# Patient Record
Sex: FEMALE | Race: WHITE | NOT HISPANIC OR LATINO | Employment: OTHER | URBAN - METROPOLITAN AREA
[De-identification: names, ages, dates, MRNs, and addresses within clinical notes are randomized per-mention and may not be internally consistent; named-entity substitution may affect disease eponyms.]

---

## 2019-03-27 ENCOUNTER — OFFICE VISIT (OUTPATIENT)
Dept: FAMILY MEDICINE CLINIC | Facility: CLINIC | Age: 64
End: 2019-03-27

## 2019-03-27 VITALS
OXYGEN SATURATION: 98 % | BODY MASS INDEX: 30.23 KG/M2 | SYSTOLIC BLOOD PRESSURE: 132 MMHG | DIASTOLIC BLOOD PRESSURE: 78 MMHG | TEMPERATURE: 98 F | HEIGHT: 67 IN | HEART RATE: 88 BPM

## 2019-03-27 DIAGNOSIS — E78.41 ELEVATED LIPOPROTEIN(A): ICD-10-CM

## 2019-03-27 DIAGNOSIS — L65.9 HAIR LOSS: ICD-10-CM

## 2019-03-27 DIAGNOSIS — E55.9 HYPOVITAMINOSIS D: ICD-10-CM

## 2019-03-27 DIAGNOSIS — Z23 NEED FOR TETANUS, DIPHTHERIA, AND ACELLULAR PERTUSSIS (TDAP) VACCINE: ICD-10-CM

## 2019-03-27 DIAGNOSIS — E11.9 TYPE 2 DIABETES MELLITUS WITHOUT COMPLICATION, WITHOUT LONG-TERM CURRENT USE OF INSULIN (HCC): Primary | ICD-10-CM

## 2019-03-27 DIAGNOSIS — E01.0 THYROMEGALY: ICD-10-CM

## 2019-03-27 DIAGNOSIS — Z23 NEED FOR VACCINATION AGAINST STREPTOCOCCUS PNEUMONIAE USING PNEUMOCOCCAL CONJUGATE VACCINE 13: ICD-10-CM

## 2019-03-27 PROBLEM — K52.9 CHRONIC DIARRHEA: Status: ACTIVE | Noted: 2019-03-27

## 2019-03-27 PROBLEM — J45.909 REACTIVE AIRWAY DISEASE WITHOUT COMPLICATION: Status: ACTIVE | Noted: 2019-03-27

## 2019-03-27 PROBLEM — E78.5 HYPERLIPIDEMIA: Status: ACTIVE | Noted: 2019-03-27

## 2019-03-27 PROBLEM — Z91.09 ENVIRONMENTAL ALLERGIES: Status: ACTIVE | Noted: 2019-03-27

## 2019-03-27 PROCEDURE — 90670 PCV13 VACCINE IM: CPT | Performed by: FAMILY MEDICINE

## 2019-03-27 PROCEDURE — 90715 TDAP VACCINE 7 YRS/> IM: CPT | Performed by: FAMILY MEDICINE

## 2019-03-27 PROCEDURE — G0009 ADMIN PNEUMOCOCCAL VACCINE: HCPCS | Performed by: FAMILY MEDICINE

## 2019-03-27 PROCEDURE — 99215 OFFICE O/P EST HI 40 MIN: CPT | Performed by: FAMILY MEDICINE

## 2019-03-27 PROCEDURE — 90471 IMMUNIZATION ADMIN: CPT | Performed by: FAMILY MEDICINE

## 2019-03-27 RX ORDER — OMEPRAZOLE 20 MG/1
20 CAPSULE, DELAYED RELEASE ORAL EVERY OTHER DAY
COMMUNITY

## 2019-03-27 RX ORDER — CETIRIZINE HYDROCHLORIDE 10 MG/1
10 TABLET ORAL DAILY
COMMUNITY
End: 2020-07-01

## 2019-03-27 NOTE — PROGRESS NOTES
Lou Kimball is a 63 y.o. female.     Chief Complaint   Patient presents with   • Establish Care     new pt establishing today with dr cheng   • Diabetes Mellitus     follow up pt complain about diarrhea side effects of metfomin        HPI     Pt is a pleasant 63 y.o. YO female here for Diabetes.  New patient to me and this office.   PMH includes diabetes well controlled with hemoglobin A1c last 6.1, hyperlipidemia well controlled on simvastatin, chronic diarrhea since being started on metformin.  She also has anxiety about her weight, wants to be 150 pounds, has not been that way over the last 20 years.  She does eat a healthy diet with lean proteins and vegetables.  Exercises daily.  She has extreme frustration with checking her weight multiple times a day.    The following portions of the patient's history were reviewed and updated as appropriate: allergies, current medications, past family history, past medical history, past social history, past surgical history and problem list.    Review of Systems   Constitutional: Negative.    HENT: Negative.    Eyes: Negative.    Respiratory: Negative.    Cardiovascular: Negative for chest pain, palpitations and leg swelling.   Gastrointestinal: Positive for diarrhea.   Endocrine: Negative for cold intolerance, heat intolerance, polydipsia, polyphagia and polyuria.   Musculoskeletal: Negative.    Allergic/Immunologic: Negative.    Neurological: Negative.    Hematological: Negative.    Psychiatric/Behavioral: Positive for sleep disturbance.       Objective  Vitals:    03/27/19 0819   BP: 132/78   Pulse: 88   Temp: 98 °F (36.7 °C)   SpO2: 98%        Physical Exam   Constitutional: She is oriented to person, place, and time. She appears well-developed and well-nourished. No distress.   HENT:   Head: Normocephalic.   Nose: Nose normal.   Eyes: EOM are normal.   Neck: Thyromegaly present.   Cardiovascular: Normal rate, regular rhythm, normal heart sounds and intact distal  pulses.   No murmur heard.  Pulmonary/Chest: Effort normal and breath sounds normal. No respiratory distress.   Musculoskeletal: Normal range of motion.   Neurological: She is alert and oriented to person, place, and time.   Skin: Skin is warm and dry. No rash noted.   Psychiatric: She has a normal mood and affect. Her behavior is normal. Judgment and thought content normal.   Nursing note and vitals reviewed.        Current Outpatient Medications:   •  cetirizine (zyrTEC) 10 MG tablet, Take 10 mg by mouth Daily., Disp: , Rfl:   •  Cholecalciferol (VITAMIN D3) 5000 units capsule capsule, Take 5,000 Units by mouth Daily., Disp: , Rfl:   •  metFORMIN (GLUCOPHAGE) 500 MG tablet, Take 500 mg by mouth 2 (Two) Times a Day With Meals., Disp: , Rfl:   •  montelukast (SINGULAIR) 10 MG tablet, Take 10 mg by mouth Every Night., Disp: , Rfl:   •  omeprazole (priLOSEC) 20 MG capsule, Take 20 mg by mouth Daily., Disp: , Rfl:   •  Pioglitazone HCl (ACTOS PO), Take  by mouth., Disp: , Rfl:   •  SIMVASTATIN PO, Take  by mouth., Disp: , Rfl:   •  Empagliflozin (JARDIANCE) 10 MG tablet, Take 1 tablet by mouth Daily., Disp: 14 tablet, Rfl: 3    Procedures    Lab Results (most recent)     None              Lou was seen today for establish care and diabetes mellitus.    Diagnoses and all orders for this visit:    Type 2 diabetes mellitus without complication, without long-term current use of insulin (CMS/[facility])  -     Comprehensive Metabolic Panel  -     Hemoglobin A1c  -     Empagliflozin (JARDIANCE) 10 MG tablet; Take 1 tablet by mouth Daily.    Elevated lipoprotein(a)  -     Comprehensive Metabolic Panel  -     Lipid Panel    Hypovitaminosis D  -     Vitamin D 25 Hydroxy    Hair loss  -     TSH Rfx On Abnormal To Free T4    Need for tetanus, diphtheria, and acellular pertussis (Tdap) vaccine  -     Tdap Vaccine Greater Than or Equal To 6yo IM    Need for vaccination against Streptococcus pneumoniae using pneumococcal conjugate  vaccine 13  -     Pneumococcal Conjugate Vaccine 13-[facility]    Thyromegaly  -     TSH Rfx On Abnormal To Free T4    Pleasant 63-year-old female here as a new patient.  She is very obsessive about her weight, refusing to be weighed today, at home she weighed today: 195.6lbs.  She is extremely frustrated as she eats a healthy diet and exercises daily.  She noticed about a 15-20 pound weight gain since starting Actos.  Her goal is to wait 150 pounds.  She last weighed that about 20 years ago, some difficulty with losing weight since menopause.  She does have hyperlipidemia and tries to eat oats to improve that, is adherent with simvastatin.  She has had symptoms of hair loss diffusely, wondering if thyroid function is abnormal.  Records requested from her last PCP in [place], due for pneumonia vaccine and tetanus vaccine.  On exam mild thyromegaly, symmetric.  Will continue to watch.    8:25 - 9:20  30 minutes was spent of the 55 minute visit in direct face to face counseling and coordination of care regarding: Diet for DM and weight loss, exercise, hx with weight gain from meds.   Encounter Diagnoses   Name Primary?   • Type 2 diabetes mellitus without complication, without long-term current use of insulin (CMS/[facility]) Yes   • Elevated lipoprotein(a)    • Hypovitaminosis D    • Hair loss    • Need for tetanus, diphtheria, and acellular pertussis (Tdap) vaccine    • Need for vaccination against Streptococcus pneumoniae using pneumococcal conjugate vaccine 13    • Thyromegaly          Return in about 4 weeks (around 4/24/2019), or if symptoms worsen or fail to improve, for Recheck Diabetes .      Caroline Gomes MD

## 2019-04-03 ENCOUNTER — TELEPHONE (OUTPATIENT)
Dept: FAMILY MEDICINE CLINIC | Facility: CLINIC | Age: 64
End: 2019-04-03

## 2019-04-03 NOTE — TELEPHONE ENCOUNTER
Pt was told to call in a week to let  know how the Empagliflozin 10 mg is working. I informed pt that  will be back in the office tomorrow. Pt requesting phone call from Dr. Gomes whenever possible sometime tomorrow to update.

## 2019-04-04 ENCOUNTER — TELEPHONE (OUTPATIENT)
Dept: FAMILY MEDICINE CLINIC | Facility: CLINIC | Age: 64
End: 2019-04-04

## 2019-04-04 DIAGNOSIS — E11.9 TYPE 2 DIABETES MELLITUS WITHOUT COMPLICATION, WITHOUT LONG-TERM CURRENT USE OF INSULIN (HCC): ICD-10-CM

## 2019-04-04 LAB
25(OH)D3+25(OH)D2 SERPL-MCNC: 33.8 NG/ML (ref 30–100)
ALBUMIN SERPL-MCNC: 4.8 G/DL (ref 3.5–5.2)
ALBUMIN/GLOB SERPL: 2.2 G/DL
ALP SERPL-CCNC: 106 U/L (ref 39–117)
ALT SERPL-CCNC: 27 U/L (ref 1–33)
AST SERPL-CCNC: 22 U/L (ref 1–32)
BILIRUB SERPL-MCNC: 0.4 MG/DL (ref 0.2–1.2)
BUN SERPL-MCNC: 12 MG/DL (ref 8–23)
BUN/CREAT SERPL: 14.6 (ref 7–25)
CALCIUM SERPL-MCNC: 10 MG/DL (ref 8.6–10.5)
CHLORIDE SERPL-SCNC: 101 MMOL/L (ref 98–107)
CHOLEST SERPL-MCNC: 179 MG/DL (ref 0–200)
CO2 SERPL-SCNC: 25.8 MMOL/L (ref 22–29)
CREAT SERPL-MCNC: 0.82 MG/DL (ref 0.57–1)
GLOBULIN SER CALC-MCNC: 2.2 GM/DL
GLUCOSE SERPL-MCNC: 153 MG/DL (ref 65–99)
HBA1C MFR BLD: 7.2 % (ref 4.8–5.6)
HDLC SERPL-MCNC: 55 MG/DL (ref 40–60)
LDLC SERPL CALC-MCNC: 87 MG/DL (ref 0–100)
POTASSIUM SERPL-SCNC: 4.2 MMOL/L (ref 3.5–5.2)
PROT SERPL-MCNC: 7 G/DL (ref 6–8.5)
SODIUM SERPL-SCNC: 140 MMOL/L (ref 136–145)
T4 FREE SERPL-MCNC: NORMAL NG/DL
TRIGL SERPL-MCNC: 183 MG/DL (ref 0–150)
TSH SERPL DL<=0.005 MIU/L-ACNC: 1.73 MIU/ML (ref 0.27–4.2)
VLDLC SERPL CALC-MCNC: 36.6 MG/DL

## 2019-04-04 NOTE — TELEPHONE ENCOUNTER
Blood sugar is going up to 143 and 155 every day going up a little bit pt has lost 6 pound in a week

## 2019-04-04 NOTE — TELEPHONE ENCOUNTER
I would advise that we continue his medications, I would be curious what time the blood sugars are checked.  I would advise only fasting morning blood sugar.  Please have her record these numbers and follow-up with me in a couple weeks.  It takes time for the medication to come to therapeutic dose.

## 2019-05-01 ENCOUNTER — OFFICE VISIT (OUTPATIENT)
Dept: FAMILY MEDICINE CLINIC | Facility: CLINIC | Age: 64
End: 2019-05-01

## 2019-05-01 VITALS
BODY MASS INDEX: 30.23 KG/M2 | HEIGHT: 67 IN | SYSTOLIC BLOOD PRESSURE: 118 MMHG | DIASTOLIC BLOOD PRESSURE: 82 MMHG | TEMPERATURE: 97.7 F | HEART RATE: 78 BPM | OXYGEN SATURATION: 98 %

## 2019-05-01 DIAGNOSIS — J45.20 MILD INTERMITTENT REACTIVE AIRWAY DISEASE WITHOUT COMPLICATION: ICD-10-CM

## 2019-05-01 DIAGNOSIS — E11.9 TYPE 2 DIABETES MELLITUS WITHOUT COMPLICATION, WITHOUT LONG-TERM CURRENT USE OF INSULIN (HCC): Primary | ICD-10-CM

## 2019-05-01 DIAGNOSIS — N89.8 VAGINAL ITCHING: ICD-10-CM

## 2019-05-01 DIAGNOSIS — E78.2 MIXED HYPERLIPIDEMIA: ICD-10-CM

## 2019-05-01 PROCEDURE — 99214 OFFICE O/P EST MOD 30 MIN: CPT | Performed by: FAMILY MEDICINE

## 2019-05-01 RX ORDER — ATORVASTATIN CALCIUM 20 MG/1
20 TABLET, FILM COATED ORAL DAILY
Qty: 90 TABLET | Refills: 3 | Status: SHIPPED | OUTPATIENT
Start: 2019-05-01 | End: 2020-03-17 | Stop reason: SDUPTHER

## 2019-05-01 RX ORDER — MONTELUKAST SODIUM 10 MG/1
10 TABLET ORAL NIGHTLY
Qty: 90 TABLET | Refills: 3 | Status: SHIPPED | OUTPATIENT
Start: 2019-05-01 | End: 2020-03-17 | Stop reason: SDUPTHER

## 2019-05-01 NOTE — PROGRESS NOTES
Lou Kimball is a 63 y.o. female.     Chief Complaint   Patient presents with   • Diabetes Mellitus     follow up complains   • Vaginal Itching     for about 4 weeks after diabetes treatment        Diabetes   She presents for her follow-up diabetic visit. She has type 2 diabetes mellitus. No MedicAlert identification noted. The initial diagnosis of diabetes was made 13 years ago. Pertinent negatives for hypoglycemia include no confusion, dizziness, hunger, mood changes, pallor, seizures, speech difficulty or sweats. Associated symptoms include weight loss. Pertinent negatives for diabetes include no blurred vision, no chest pain, no fatigue, no foot paresthesias, no foot ulcerations, no polydipsia, no polyphagia, no polyuria, no visual change and no weakness. Pertinent negatives for hypoglycemia complications include no blackouts, no hospitalization, no required assistance and no required glucagon injection. Pertinent negatives for diabetic complications include no CVA, heart disease, nephropathy, peripheral neuropathy or retinopathy. Risk factors for coronary artery disease include dyslipidemia, family history and obesity. Current diabetic treatment includes diet and oral agent (dual therapy). She is compliant with treatment all of the time. Her weight is fluctuating minimally. She is following a generally healthy diet. Meal planning includes ADA exchanges. She has not had a previous visit with a dietitian. She participates in exercise daily. She monitors blood glucose at home 1-2 x per week. Blood glucose monitoring compliance is excellent. Her home blood glucose trend is decreasing steadily. She does not see a podiatrist.Eye exam is current.      Vaginal itching, new problem.  Started after starting Jardiance.  Clear discharge without odor.  Some pruritus.  She has not had issues with yeast infections or bacterial vaginosis in the past.  Wondering if this is a side effect from the medication.    The  following portions of the patient's history were reviewed and updated as appropriate: allergies, current medications, past family history, past medical history, past social history, past surgical history and problem list.    Review of Systems   Constitutional: Positive for weight loss. Negative for fatigue.   Eyes: Negative for blurred vision.   Cardiovascular: Negative for chest pain.   Endocrine: Negative for polydipsia, polyphagia and polyuria.   Skin: Negative for pallor.   Neurological: Negative for dizziness, seizures, speech difficulty and weakness.   Psychiatric/Behavioral: Negative for confusion.       Objective  Vitals:    05/01/19 0805   BP: 118/82   Pulse: 78   Temp: 97.7 °F (36.5 °C)   SpO2: 98%        Physical Exam   Constitutional: She is oriented to person, place, and time. She appears well-developed and well-nourished. No distress.   HENT:   Head: Normocephalic.   Nose: Nose normal.   Eyes: EOM are normal. No scleral icterus.   Pulmonary/Chest: Effort normal. No respiratory distress.   Genitourinary:   Genitourinary Comments: Atrophy of the vaginal wall, clear discharge, no odor.  No tenderness   Musculoskeletal: Normal range of motion.   Neurological: She is alert and oriented to person, place, and time.   Skin: Skin is warm and dry. No rash noted.   Psychiatric: She has a normal mood and affect. Her behavior is normal. Judgment and thought content normal.   Nursing note and vitals reviewed.        Current Outpatient Medications:   •  cetirizine (zyrTEC) 10 MG tablet, Take 10 mg by mouth Daily., Disp: , Rfl:   •  Cholecalciferol (VITAMIN D3) 5000 units capsule capsule, Take 5,000 Units by mouth Daily., Disp: , Rfl:   •  Empagliflozin (JARDIANCE) 10 MG tablet, Take 10 mg by mouth Daily., Disp: 90 tablet, Rfl: 3  •  metFORMIN (GLUCOPHAGE) 500 MG tablet, Take 1 tablet by mouth 2 (Two) Times a Day With Meals., Disp: 180 tablet, Rfl: 3  •  montelukast (SINGULAIR) 10 MG tablet, Take 1 tablet by mouth  Every Night., Disp: 90 tablet, Rfl: 3  •  atorvastatin (LIPITOR) 20 MG tablet, Take 1 tablet by mouth Daily., Disp: 90 tablet, Rfl: 3  •  omeprazole (priLOSEC) 20 MG capsule, Take 20 mg by mouth Daily., Disp: , Rfl:     Procedures    Lab Results (most recent)     None              Lou was seen today for diabetes mellitus and vaginal itching.    Diagnoses and all orders for this visit:    Type 2 diabetes mellitus without complication, without long-term current use of insulin (CMS/[facility])  -     metFORMIN (GLUCOPHAGE) 500 MG tablet; Take 1 tablet by mouth 2 (Two) Times a Day With Meals.    Mild intermittent reactive airway disease without complication  -     montelukast (SINGULAIR) 10 MG tablet; Take 1 tablet by mouth Every Night.    Mixed hyperlipidemia  -     atorvastatin (LIPITOR) 20 MG tablet; Take 1 tablet by mouth Daily.    Vaginal itching  -     NuSwab BV & Candida - Swab, Vagina      Type 2 diabetes previously with hemoglobin A1c of 7.2, it does seem that her blood sugars have crept up a little bit.  I think this could be from nutrition.  We discussed lower carb options for dinner.  Goal fasting blood sugar less than 130.  Cost of Jardiance was $700.    Mild reactive airway disease, stable montelukast, refill medication today.    Hyperlipidemia, previously on simvastatin, will change to atorvastatin as research is better for this.    Vaginal itching, likely vaginal atrophy.  However will check swab today for bacterial vaginosis and yeast infection.    She has a phobia of having her weight checked at the doctor's.  Refused again today.  Will bring in her scale from home to titrate.  She weighed 188 pounds at home.    Return in about 2 months (around 7/1/2019), or if symptoms worsen or fail to improve, for Recheck Diabetes .      Caroline Gomes MD

## 2019-05-03 LAB
A VAGINAE DNA VAG QL NAA+PROBE: NORMAL SCORE
BVAB2 DNA VAG QL NAA+PROBE: NORMAL SCORE
C ALBICANS DNA VAG QL NAA+PROBE: NEGATIVE
C GLABRATA DNA VAG QL NAA+PROBE: NEGATIVE
MEGA1 DNA VAG QL NAA+PROBE: NORMAL SCORE

## 2019-05-08 NOTE — PROGRESS NOTES
Please call patient back with results.  The vaginal swab has resulted as negative for yeast infection or bacterial vaginosis. This could be related to vaginal atrophy that is getting irritated with a higher blood sugar levels in the urine.  I would not recommend making any changes at this time, however if it worsens or is bothersome we can consider further evaluation.  Please let me know if there are further questions.  Thank you

## 2019-06-28 ENCOUNTER — OFFICE VISIT (OUTPATIENT)
Dept: FAMILY MEDICINE CLINIC | Facility: CLINIC | Age: 64
End: 2019-06-28

## 2019-06-28 VITALS
DIASTOLIC BLOOD PRESSURE: 82 MMHG | WEIGHT: 180 LBS | TEMPERATURE: 98.2 F | HEIGHT: 67 IN | BODY MASS INDEX: 28.25 KG/M2 | HEART RATE: 65 BPM | SYSTOLIC BLOOD PRESSURE: 124 MMHG | OXYGEN SATURATION: 99 %

## 2019-06-28 DIAGNOSIS — R19.7 DIARRHEA, UNSPECIFIED TYPE: ICD-10-CM

## 2019-06-28 DIAGNOSIS — E11.9 TYPE 2 DIABETES MELLITUS WITHOUT COMPLICATION, WITHOUT LONG-TERM CURRENT USE OF INSULIN (HCC): Primary | ICD-10-CM

## 2019-06-28 DIAGNOSIS — Z12.39 ENCOUNTER FOR SCREENING BREAST EXAMINATION: ICD-10-CM

## 2019-06-28 PROCEDURE — 99214 OFFICE O/P EST MOD 30 MIN: CPT | Performed by: FAMILY MEDICINE

## 2019-06-28 RX ORDER — DIPHENOXYLATE HYDROCHLORIDE AND ATROPINE SULFATE 2.5; .025 MG/1; MG/1
1 TABLET ORAL 4 TIMES DAILY PRN
Qty: 30 TABLET | Refills: 0 | Status: SHIPPED | OUTPATIENT
Start: 2019-06-28 | End: 2019-11-05

## 2019-06-28 NOTE — PROGRESS NOTES
Lou Kimball is a 63 y.o. female.     Chief Complaint   Patient presents with   • Diabetes Mellitus     follow up no complains   • Diarrhea       HPI     Pt is a pleasant 63 y.o. YO female here for Diabetes and new problem to me of diarrhea.      Diabetes: improving, her blood sugars have moved from the 130s 140s to now 90s and 80s fasting in the morning.  She has started Jardiance and has lost about 20 pounds.  She feels significantly healthier.  Additionally she has also made lifestyle changes with now doing a keto diet, having difficulty finding plant options to include as well.  Her diet does have lots of saturated fats at the moment.  Her weight has improved significantly, at home her weight was 208 and is improved to 180 pounds.  208 to 180 lbs.      Diarrhea, this is a new problem to me, it has been a chronic issue for the patient for years.  She does feel like her diarrhea is improved with the dietary changes she has made.  She will be traveling to [place] for [employer] this summer and often gets diarrhea when traveling.  Requesting Lomotil to help treat these issues.    The following portions of the patient's history were reviewed and updated as appropriate: allergies, current medications, past family history, past medical history, past social history, past surgical history and problem list.    Review of Systems   Constitutional: Negative.    HENT: Positive for sore throat (right side).    Eyes: Negative.    Respiratory: Negative.    Cardiovascular: Negative.    Gastrointestinal: Negative.    Endocrine: Negative for cold intolerance, heat intolerance, polydipsia and polyphagia.   Genitourinary: Negative.    Musculoskeletal: Negative.    Neurological: Negative for numbness.   Hematological: Negative.    Psychiatric/Behavioral: Negative.        Objective  Vitals:    06/28/19 0810   BP: 124/82   Pulse: 65   Temp: 98.2 °F (36.8 °C)   SpO2: 99%        Physical Exam   Constitutional: She is oriented to person,  place, and time. She appears well-developed and well-nourished. No distress.   HENT:   Head: Normocephalic.   Nose: Nose normal.   Eyes: EOM are normal.   Cardiovascular: Normal rate, regular rhythm, normal heart sounds and intact distal pulses.   No murmur heard.  Pulmonary/Chest: Effort normal and breath sounds normal. No respiratory distress.   Musculoskeletal: Normal range of motion.   Neurological: She is alert and oriented to person, place, and time.   Skin: Skin is warm and dry. No rash noted.   Psychiatric: She has a normal mood and affect. Her behavior is normal. Judgment and thought content normal.   Nursing note and vitals reviewed.        Current Outpatient Medications:   •  atorvastatin (LIPITOR) 20 MG tablet, Take 1 tablet by mouth Daily., Disp: 90 tablet, Rfl: 3  •  cetirizine (zyrTEC) 10 MG tablet, Take 10 mg by mouth Daily., Disp: , Rfl:   •  Cholecalciferol (VITAMIN D3) 5000 units capsule capsule, Take 5,000 Units by mouth Daily., Disp: , Rfl:   •  Empagliflozin (JARDIANCE) 10 MG tablet, Take 10 mg by mouth Daily., Disp: 90 tablet, Rfl: 3  •  metFORMIN (GLUCOPHAGE) 500 MG tablet, Take 1 tablet by mouth 2 (Two) Times a Day With Meals., Disp: 180 tablet, Rfl: 3  •  montelukast (SINGULAIR) 10 MG tablet, Take 1 tablet by mouth Every Night., Disp: 90 tablet, Rfl: 3  •  omeprazole (priLOSEC) 20 MG capsule, Take 20 mg by mouth Daily., Disp: , Rfl:   •  diphenoxylate-atropine (LOMOTIL) 2.5-0.025 MG per tablet, Take 1 tablet by mouth 4 (Four) Times a Day As Needed for Diarrhea., Disp: 30 tablet, Rfl: 0    Procedures    Lab Results (most recent)     None              Lou was seen today for diabetes mellitus and diarrhea.    Diagnoses and all orders for this visit:    Type 2 diabetes mellitus without complication, without long-term current use of insulin (CMS/[facility])  -     Hemoglobin A1c    Diarrhea, unspecified type  -     diphenoxylate-atropine (LOMOTIL) 2.5-0.025 MG per tablet; Take 1 tablet by mouth 4  (Four) Times a Day As Needed for Diarrhea.    Encounter for screening breast examination  -     Mammo Screening Bilateral With CAD; Future      Type 2 diabetes well controlled with metformin and Jardiance, she has lost about 20 pounds.  Additionally making dietary changes with now eating a ketogenic diet.  We discussed that it would be wise to include high plant sources of fat.  She has not been any medically vegetables, my concern is with high saturated fats.  Okay to decrease Jardiance to every other day as it has been very expensive.  Samples given today.    Diarrhea when traveling, chronic diarrhea seems to improve significantly with the dietary changes she made.  Prescription for Lomotil to use while traveling in [place].    Patient due for breast cancer screening in September.  She did have one abnormal mammogram in 2017 and required stereotactic biopsy that was negative.  Otherwise hemogram since have been reassuring.  These prior were done in [place].    Return if symptoms worsen or fail to improve, for Medicare Wellness in Sept .      Caroline Gomes MD

## 2019-06-29 LAB — HBA1C MFR BLD: 7 % (ref 4.8–5.6)

## 2019-08-14 RX ORDER — ALBUTEROL SULFATE 90 UG/1
2 AEROSOL, METERED RESPIRATORY (INHALATION) EVERY 4 HOURS PRN
Qty: 1 INHALER | Refills: 1 | Status: SHIPPED | OUTPATIENT
Start: 2019-08-14 | End: 2019-08-15 | Stop reason: SDUPTHER

## 2019-08-15 RX ORDER — ALBUTEROL SULFATE 90 UG/1
2 AEROSOL, METERED RESPIRATORY (INHALATION) EVERY 4 HOURS PRN
Qty: 1 INHALER | Refills: 1 | Status: SHIPPED | OUTPATIENT
Start: 2019-08-15 | End: 2020-03-17 | Stop reason: SDUPTHER

## 2019-09-09 ENCOUNTER — HOSPITAL ENCOUNTER (OUTPATIENT)
Dept: MAMMOGRAPHY | Facility: HOSPITAL | Age: 64
Discharge: HOME | End: 2019-09-09
Admitting: FAMILY MEDICINE

## 2019-09-09 DIAGNOSIS — Z12.39 ENCOUNTER FOR SCREENING BREAST EXAMINATION: ICD-10-CM

## 2019-09-09 PROCEDURE — 77067 SCR MAMMO BI INCL CAD: CPT

## 2019-09-11 NOTE — PROGRESS NOTES
Please call patient back with mammogram.  There is no evidence of breast cancer.  We can continue routine follow up.     Thank you

## 2019-09-19 ENCOUNTER — OFFICE VISIT (OUTPATIENT)
Dept: FAMILY MEDICINE CLINIC | Facility: CLINIC | Age: 64
End: 2019-09-19

## 2019-09-19 VITALS
TEMPERATURE: 97.8 F | HEIGHT: 67 IN | DIASTOLIC BLOOD PRESSURE: 78 MMHG | BODY MASS INDEX: 27.47 KG/M2 | SYSTOLIC BLOOD PRESSURE: 114 MMHG | WEIGHT: 175 LBS | HEART RATE: 73 BPM | OXYGEN SATURATION: 99 %

## 2019-09-19 DIAGNOSIS — Z78.0 POSTMENOPAUSAL: ICD-10-CM

## 2019-09-19 DIAGNOSIS — Z11.59 ENCOUNTER FOR HEPATITIS C SCREENING TEST FOR LOW RISK PATIENT: ICD-10-CM

## 2019-09-19 DIAGNOSIS — Z00.00 MEDICARE ANNUAL WELLNESS VISIT, SUBSEQUENT: Primary | ICD-10-CM

## 2019-09-19 DIAGNOSIS — E11.9 TYPE 2 DIABETES MELLITUS WITHOUT COMPLICATION, WITHOUT LONG-TERM CURRENT USE OF INSULIN (HCC): ICD-10-CM

## 2019-09-19 DIAGNOSIS — E55.9 HYPOVITAMINOSIS D: ICD-10-CM

## 2019-09-19 DIAGNOSIS — E78.41 ELEVATED LIPOPROTEIN(A): ICD-10-CM

## 2019-09-19 PROBLEM — K52.9 CHRONIC DIARRHEA: Status: RESOLVED | Noted: 2019-03-27 | Resolved: 2019-09-19

## 2019-09-19 PROCEDURE — G0438 PPPS, INITIAL VISIT: HCPCS | Performed by: FAMILY MEDICINE

## 2019-09-19 RX ORDER — ZOSTER VACCINE RECOMBINANT, ADJUVANTED 50 MCG/0.5
KIT INTRAMUSCULAR
COMMUNITY
Start: 2019-09-14 | End: 2019-09-19

## 2019-09-19 NOTE — PROGRESS NOTES
The ABCs of the Annual Wellness Visit  Initial Medicare Wellness Visit    Chief Complaint   Patient presents with   • Annual Exam     awv       Subjective   History of Present Illness:  Lou Kimball is a 64 y.o. female who presents for an Initial Medicare Wellness Visit.    HEALTH RISK ASSESSMENT    Recent Hospitalizations:  No hospitalization(s) within the last year.    Current Medical Providers:  Patient Care Team:  Caroline Gomes MD as PCP - General (Family Medicine)  Caroline Gomes MD as PCP - Claims Attributed    Smoking Status:  Social History     Tobacco Use   Smoking Status Never Smoker       Alcohol Consumption:  Social History     Substance and Sexual Activity   Alcohol Use No       Depression Screen:   PHQ-2/PHQ-9 Depression Screening 9/19/2019   Little interest or pleasure in doing things 0   Feeling down, depressed, or hopeless 0   Total Score 0       Fall Risk Screen:  INOCENCIO Fall Risk Assessment was completed, and patient is at MODERATE risk for falls. Assessment completed on:9/19/2019    Health Habits and Functional and Cognitive Screening:  Functional & Cognitive Status 9/19/2019   Do you have difficulty preparing food and eating? No   Do you have difficulty bathing yourself, getting dressed or grooming yourself? No   Do you have difficulty using the toilet? No   Do you have difficulty moving around from place to place? No   Do you have trouble with steps or getting out of a bed or a chair? No   Current Diet Well Balanced Diet   Dental Exam Up to date   Eye Exam Up to date   Exercise (times per week) 7 times per week   Current Exercise Activities Include Walking   Do you need help using the phone?  No   Are you deaf or do you have serious difficulty hearing?  No   Do you need help with transportation? No   Do you need help shopping? No   Do you need help preparing meals?  No   Do you need help with housework?  No   Do you need help with laundry? No   Do you need help taking your medications? No    Do you need help managing money? No   Have you felt unusual stress, anger or loneliness in the last month? No   Who do you live with? Spouse   If you need help, do you have trouble finding someone available to you? No   Have you been bothered in the last four weeks by sexual problems? No   Do you have difficulty concentrating, remembering or making decisions? No         Does the patient have evidence of cognitive impairment? No    Asprin use counseling:Does not need ASA (and currently is not on it)    Age-appropriate Screening Schedule:  Refer to the list below for future screening recommendations based on patient's age, sex and/or medical conditions. Orders for these recommended tests are listed in the plan section. The patient has been provided with a written plan.    Health Maintenance   Topic Date Due   • URINE MICROALBUMIN  1955   • DIABETIC FOOT EXAM  03/27/2019   • ZOSTER VACCINE (2 of 2) 11/09/2019   • HEMOGLOBIN A1C  12/28/2019   • PNEUMOCOCCAL VACCINES (65+ LOW/MEDIUM RISK) (2 of 2 - PPSV23) 03/27/2020   • LIPID PANEL  04/03/2020   • DIABETIC EYE EXAM  05/01/2020   • COLONOSCOPY  01/01/2021   • MAMMOGRAM  09/09/2021   • TDAP/TD VACCINES (2 - Td) 03/27/2029   • INFLUENZA VACCINE  Completed          The following portions of the patient's history were reviewed and updated as appropriate: allergies, current medications, past family history, past medical history, past social history, past surgical history and problem list.    Outpatient Medications Prior to Visit   Medication Sig Dispense Refill   • albuterol sulfate  (90 Base) MCG/ACT inhaler Inhale 2 puffs Every 4 (Four) Hours As Needed for Wheezing. 1 inhaler 1   • atorvastatin (LIPITOR) 20 MG tablet Take 1 tablet by mouth Daily. 90 tablet 3   • BABY ASPIRIN PO Take 1 tablet by mouth Every Other Day.     • cetirizine (zyrTEC) 10 MG tablet Take 10 mg by mouth Daily.     • Cholecalciferol (VITAMIN D3) 5000 units capsule capsule Take 5,000 Units  by mouth Daily.     • diphenoxylate-atropine (LOMOTIL) 2.5-0.025 MG per tablet Take 1 tablet by mouth 4 (Four) Times a Day As Needed for Diarrhea. 30 tablet 0   • Empagliflozin (JARDIANCE) 10 MG tablet Take 10 mg by mouth Daily. 90 tablet 3   • metFORMIN (GLUCOPHAGE) 500 MG tablet Take 1 tablet by mouth 2 (Two) Times a Day With Meals. 180 tablet 3   • montelukast (SINGULAIR) 10 MG tablet Take 1 tablet by mouth Every Night. 90 tablet 3   • omeprazole (priLOSEC) 20 MG capsule Take 20 mg by mouth Daily.     • azithromycin (ZITHROMAX) 250 MG tablet Take 2 tablets the first day, then 1 tablet daily for 4 days. 6 tablet 0   • SHINGRIX 50 MCG/0.5ML reconstituted suspension        No facility-administered medications prior to visit.        Patient Active Problem List   Diagnosis   • Type 2 diabetes mellitus without complication, without long-term current use of insulin (CMS/[facility])   • Hyperlipidemia   • Environmental allergies   • Reactive airway disease without complication       Advanced Care Planning:  Patient has an advance directive - a copy has not been provided. Have asked the patient to send this to us to add to record    Review of Systems   Constitutional: Negative.    HENT: Negative.    Eyes: Negative.    Respiratory: Negative.    Cardiovascular: Negative.    Gastrointestinal: Negative.    Endocrine: Negative.    Genitourinary: Negative.    Musculoskeletal: Negative.    Skin: Negative.    Allergic/Immunologic: Negative.    Neurological: Negative.    Hematological: Negative.    Psychiatric/Behavioral: Negative.        Compared to one year ago, the patient feels her physical health is better - weighing less.  Compared to one year ago, the patient feels her mental health is the same.    Reviewed chart for potential of high risk medication in the elderly: yes  Reviewed chart for potential of harmful drug interactions in the elderly:yes    Objective         Vitals:    09/19/19 0755   BP: 114/78   Pulse: 73   Temp: 97.8  "°F (36.6 °C)   TempSrc: Oral   SpO2: 99%   Weight: 79.4 kg (175 lb)  Comment: pt refuse   Height: 170.2 cm (67\")   PainSc: 0-No pain       Body mass index is 27.41 kg/m².  Discussed the patient's BMI with her. The BMI is above average; BMI management plan is completed.    Physical Exam   Constitutional: She is oriented to person, place, and time. She appears well-developed and well-nourished. No distress.   HENT:   Head: Normocephalic.   Nose: Nose normal.   Eyes: EOM are normal.   Cardiovascular: Normal rate, regular rhythm, normal heart sounds and intact distal pulses.   No murmur heard.  Pulmonary/Chest: Effort normal and breath sounds normal. No respiratory distress.   Musculoskeletal: Normal range of motion.   Neurological: She is alert and oriented to person, place, and time.   Skin: Skin is warm and dry. No rash noted.   Psychiatric: She has a normal mood and affect. Her behavior is normal. Judgment and thought content normal.   Nursing note and vitals reviewed.      Lab Results   Component Value Date    HGBA1C 7.00 (H) 06/28/2019        Assessment/Plan   Medicare Risks and Personalized Health Plan  CMS Preventative Services Quick Reference  Colon Cancer Screening  Depression/Dysphoria  Immunizations Discussed/Encouraged (specific immunizations; Had all vaccines recently with going to [place].  UTD )  Inactivity/Sedentary  Obesity/Overweight   Osteoprorosis Risk    The above risks/problems have been discussed with the patient.  Pertinent information has been shared with the patient in the After Visit Summary.  Follow up plans and orders are seen below in the Assessment/Plan Section.    Weight loss: has been eating Keto and has lost about 15 lbs  Exercise: doing weights, walking.     Diagnoses and all orders for this visit:    1. Medicare annual wellness visit, subsequent (Primary)    2. Type 2 diabetes mellitus without complication, without long-term current use of insulin (CMS/[facility])  -     Hemoglobin A1c    3. " Elevated lipoprotein(a)  -     Hemoglobin A1c  -     Comprehensive Metabolic Panel  -     Lipid Panel    4. Hypovitaminosis D  -     Vitamin D 25 Hydroxy    5. Encounter for hepatitis C screening test for low risk patient  -     Hepatitis C Antibody    6. Postmenopausal  -     DEXA Bone Density Axial; Future      HM UTD, she has been eating a plant-based diet and keto diet and lost about 20 pounds.  Starting to exercise and noticing improvement in her body shape and energy.  Continue with this, immunizations all up-to-date as she is traveling to [place].  Colonoscopy and mammogram up-to-date, due for DEXA scan.  Otherwise labs as above.      Will get microalbumin at next visit as she is not on an ACEi.  BP is normal.     Follow Up:  Return in about 3 months (around 12/19/2019), or if symptoms worsen or fail to improve, for Recheck Diabetes with microalbumin .     An After Visit Summary and PPPS were given to the patient.

## 2019-09-20 LAB
25(OH)D3+25(OH)D2 SERPL-MCNC: 29.6 NG/ML (ref 30–100)
ALBUMIN SERPL-MCNC: 4.4 G/DL (ref 3.5–5.2)
ALBUMIN/GLOB SERPL: 1.6 G/DL
ALP SERPL-CCNC: 121 U/L (ref 39–117)
ALT SERPL-CCNC: 30 U/L (ref 1–33)
AST SERPL-CCNC: 17 U/L (ref 1–32)
BILIRUB SERPL-MCNC: 0.5 MG/DL (ref 0.2–1.2)
BUN SERPL-MCNC: 14 MG/DL (ref 8–23)
BUN/CREAT SERPL: 18.2 (ref 7–25)
CALCIUM SERPL-MCNC: 9.7 MG/DL (ref 8.6–10.5)
CHLORIDE SERPL-SCNC: 102 MMOL/L (ref 98–107)
CHOLEST SERPL-MCNC: 148 MG/DL (ref 0–200)
CO2 SERPL-SCNC: 23.1 MMOL/L (ref 22–29)
CREAT SERPL-MCNC: 0.77 MG/DL (ref 0.57–1)
GLOBULIN SER CALC-MCNC: 2.7 GM/DL
GLUCOSE SERPL-MCNC: 128 MG/DL (ref 65–99)
HBA1C MFR BLD: 7.8 % (ref 4.8–5.6)
HCV AB S/CO SERPL IA: <0.1 S/CO RATIO (ref 0–0.9)
HDLC SERPL-MCNC: 44 MG/DL (ref 40–60)
LDLC SERPL CALC-MCNC: 70 MG/DL (ref 0–100)
POTASSIUM SERPL-SCNC: 4.4 MMOL/L (ref 3.5–5.2)
PROT SERPL-MCNC: 7.1 G/DL (ref 6–8.5)
SODIUM SERPL-SCNC: 141 MMOL/L (ref 136–145)
TRIGL SERPL-MCNC: 172 MG/DL (ref 0–150)
VLDLC SERPL CALC-MCNC: 34.4 MG/DL

## 2019-09-26 NOTE — PROGRESS NOTES
Please call patient back with results.  The labs has resulted as slightly worsened hemoglobin A1c is 7.8.  I do think that the lifestyle changes you have made are good.  It may be that you are eating too many calories for what you need right now.  I would stay with the same diet but decrease overall caloric intake by 2 to 300 db to see if this will help improve your hemoglobin A1c.  We can recheck the liver enzymes and hemoglobin A1c in 3 months.    Thank you

## 2019-11-05 ENCOUNTER — OFFICE VISIT (OUTPATIENT)
Dept: FAMILY MEDICINE CLINIC | Facility: CLINIC | Age: 64
End: 2019-11-05

## 2019-11-05 VITALS
TEMPERATURE: 97.8 F | HEIGHT: 67 IN | SYSTOLIC BLOOD PRESSURE: 122 MMHG | DIASTOLIC BLOOD PRESSURE: 70 MMHG | BODY MASS INDEX: 27 KG/M2 | OXYGEN SATURATION: 98 % | HEART RATE: 75 BPM | WEIGHT: 172 LBS

## 2019-11-05 DIAGNOSIS — I49.3 PVC (PREMATURE VENTRICULAR CONTRACTION): ICD-10-CM

## 2019-11-05 DIAGNOSIS — Z98.818 OTHER DENTAL PROCEDURE STATUS: Primary | ICD-10-CM

## 2019-11-05 DIAGNOSIS — E11.9 TYPE 2 DIABETES MELLITUS WITHOUT COMPLICATION, WITHOUT LONG-TERM CURRENT USE OF INSULIN (HCC): ICD-10-CM

## 2019-11-05 PROCEDURE — 99213 OFFICE O/P EST LOW 20 MIN: CPT | Performed by: FAMILY MEDICINE

## 2019-11-05 NOTE — PROGRESS NOTES
Lou Kimball is a 64 y.o. female.     Chief Complaint   Patient presents with   • Medication Problem     pt will have oral surgery and would like to talk about medication they will give her       HPI     Pt is a pleasant 64 y.o. YO female here for Dental issues, had a dental bridge .  She has had steroids in the past and was told that this will help minimize swelling and minimize pain after her procedure.  Previously when given a shot of steroid she got PVCs and caused her blood sugars to rise.  She is concerned that her hemoglobin A1c will worsen.  She has lost another 20 pounds with lifestyle changes.  We discussed continuing with metformin and Jardiance.  She does seem to be responding well to this.    The following portions of the patient's history were reviewed and updated as appropriate: allergies, current medications, past family history, past medical history, past social history, past surgical history and problem list.    Review of Systems   Constitutional: Negative.    HENT: Negative.    Eyes: Negative.    Respiratory: Negative.    Cardiovascular: Negative.    Gastrointestinal: Negative.    Endocrine: Negative.    Genitourinary: Negative.    Musculoskeletal: Negative.    Skin: Negative.    Allergic/Immunologic: Negative.    Neurological: Negative.    Hematological: Negative.    Psychiatric/Behavioral: Negative.        Objective  Vitals:    11/05/19 1354   BP: 122/70   Pulse: 75   Temp: 97.8 °F (36.6 °C)   SpO2: 98%        Physical Exam   Constitutional: She is oriented to person, place, and time. She appears well-developed and well-nourished. No distress.   HENT:   Head: Normocephalic.   Nose: Nose normal.   Eyes: EOM are normal. No scleral icterus.   Pulmonary/Chest: Effort normal. No respiratory distress.   Musculoskeletal: Normal range of motion.   Neurological: She is alert and oriented to person, place, and time.   Skin: Skin is warm and dry. No rash noted.   Psychiatric: She has a normal mood  and affect. Her behavior is normal. Judgment and thought content normal.   Nursing note and vitals reviewed.        Current Outpatient Medications:   •  albuterol sulfate  (90 Base) MCG/ACT inhaler, Inhale 2 puffs Every 4 (Four) Hours As Needed for Wheezing., Disp: 1 inhaler, Rfl: 1  •  atorvastatin (LIPITOR) 20 MG tablet, Take 1 tablet by mouth Daily., Disp: 90 tablet, Rfl: 3  •  BABY ASPIRIN PO, Take 1 tablet by mouth Every Other Day., Disp: , Rfl:   •  cetirizine (zyrTEC) 10 MG tablet, Take 10 mg by mouth Daily., Disp: , Rfl:   •  Cholecalciferol (VITAMIN D3) 5000 units capsule capsule, Take 5,000 Units by mouth Daily., Disp: , Rfl:   •  Empagliflozin (JARDIANCE) 10 MG tablet, Take 10 mg by mouth Daily., Disp: 90 tablet, Rfl: 3  •  metFORMIN (GLUCOPHAGE) 500 MG tablet, Take 1 tablet by mouth 2 (Two) Times a Day With Meals., Disp: 180 tablet, Rfl: 3  •  montelukast (SINGULAIR) 10 MG tablet, Take 1 tablet by mouth Every Night., Disp: 90 tablet, Rfl: 3  •  omeprazole (priLOSEC) 20 MG capsule, Take 20 mg by mouth Daily., Disp: , Rfl:     Procedures    Lab Results (most recent)     None              Lou was seen today for medication problem.    Diagnoses and all orders for this visit:    Other dental procedure status    PVC (premature ventricular contraction)    Type 2 diabetes mellitus without complication, without long-term current use of insulin (CMS/[facility])    Patient here to ask questions about getting steroid injections for dental bridge procedure.  She is concerned for blood sugar and history of PVCs after steroid injections.  As she has never had arrhythmia and her diabetes has been well controlled and she has lost an additional 20 pounds with lifestyle measurements, I think it would be okay to receive steroids as long as she is able to be followed closely after anesthesia.      Return if symptoms worsen or fail to improve.      Caroline Gomes MD

## 2019-11-06 ENCOUNTER — HOSPITAL ENCOUNTER (OUTPATIENT)
Dept: BONE DENSITY | Facility: HOSPITAL | Age: 64
Discharge: HOME | End: 2019-11-06
Admitting: FAMILY MEDICINE

## 2019-11-06 DIAGNOSIS — Z78.0 POSTMENOPAUSAL: ICD-10-CM

## 2019-11-06 PROCEDURE — 77080 DXA BONE DENSITY AXIAL: CPT

## 2019-11-14 NOTE — PROGRESS NOTES
Please call patient back with results.  The DEXA Scan has resulted as Osteopenia.  I recommend doing weight bearing exercises and Calcium and Vit D supplementation. OK to repeat in 2 years.    Thank you

## 2019-12-19 ENCOUNTER — OFFICE VISIT (OUTPATIENT)
Dept: FAMILY MEDICINE CLINIC | Facility: CLINIC | Age: 64
End: 2019-12-19

## 2019-12-19 VITALS
DIASTOLIC BLOOD PRESSURE: 72 MMHG | SYSTOLIC BLOOD PRESSURE: 126 MMHG | HEIGHT: 67 IN | BODY MASS INDEX: 26.94 KG/M2 | OXYGEN SATURATION: 98 % | HEART RATE: 73 BPM | TEMPERATURE: 97.9 F

## 2019-12-19 DIAGNOSIS — E11.9 TYPE 2 DIABETES MELLITUS WITHOUT COMPLICATION, WITHOUT LONG-TERM CURRENT USE OF INSULIN (HCC): Primary | ICD-10-CM

## 2019-12-19 LAB
BILIRUB BLD-MCNC: NEGATIVE MG/DL
CLARITY, POC: CLEAR
COLOR UR: YELLOW
GLUCOSE UR STRIP-MCNC: ABNORMAL MG/DL
KETONES UR QL: NEGATIVE
LEUKOCYTE EST, POC: NEGATIVE
NITRITE UR-MCNC: NEGATIVE MG/ML
PH UR: 5.5 [PH] (ref 5–8)
POC CREATININE URINE: 100
POC MICROALBUMIN URINE: 10
PROT UR STRIP-MCNC: NEGATIVE MG/DL
RBC # UR STRIP: NEGATIVE /UL
SP GR UR: 1.02 (ref 1–1.03)
UROBILINOGEN UR QL: NORMAL

## 2019-12-19 PROCEDURE — 99213 OFFICE O/P EST LOW 20 MIN: CPT | Performed by: FAMILY MEDICINE

## 2019-12-19 PROCEDURE — 82044 UR ALBUMIN SEMIQUANTITATIVE: CPT | Performed by: FAMILY MEDICINE

## 2019-12-19 PROCEDURE — 81003 URINALYSIS AUTO W/O SCOPE: CPT | Performed by: FAMILY MEDICINE

## 2019-12-19 RX ORDER — METFORMIN HYDROCHLORIDE 500 MG/1
TABLET, EXTENDED RELEASE ORAL
COMMUNITY
Start: 2019-11-19 | End: 2019-12-19

## 2019-12-19 RX ORDER — IBUPROFEN 800 MG/1
TABLET ORAL
COMMUNITY
Start: 2019-11-18 | End: 2020-07-01

## 2019-12-19 NOTE — PROGRESS NOTES
Lou Kimball is a 64 y.o. female.     Chief Complaint   Patient presents with   • Diabetes Mellitus     follow up        Diabetes   She presents for her follow-up diabetic visit. She has type 2 diabetes mellitus. No MedicAlert identification noted. The initial diagnosis of diabetes was made 14 years ago. Pertinent negatives for hypoglycemia include no confusion, dizziness, headaches, hunger, mood changes, nervousness/anxiousness, pallor, seizures, sleepiness, speech difficulty, sweats or tremors. Pertinent negatives for diabetes include no blurred vision, no chest pain, no fatigue, no foot paresthesias, no foot ulcerations, no polydipsia, no polyphagia, no polyuria, no visual change, no weakness and no weight loss. Pertinent negatives for hypoglycemia complications include no blackouts, no hospitalization, no nocturnal hypoglycemia, no required assistance and no required glucagon injection. Symptoms are stable. Pertinent negatives for diabetic complications include no CVA, heart disease, impotence, nephropathy, peripheral neuropathy, PVD or retinopathy. Risk factors for coronary artery disease include dyslipidemia and post-menopausal. Current diabetic treatment includes diet and oral agent (dual therapy). She is compliant with treatment most of the time. Her weight is decreasing steadily. Meal planning includes carbohydrate counting. She has not had a previous visit with a dietitian. She participates in exercise daily. She monitors urine at home <1 x per month. Blood glucose monitoring compliance is good. Her home blood glucose trend is fluctuating minimally. Her breakfast blood glucose is taken between 6-7 am. Her breakfast blood glucose range is generally 70-90 mg/dl. Her highest blood glucose is 110-130 mg/dl. She does not see a podiatrist.Eye exam is current.        The following portions of the patient's history were reviewed and updated as appropriate: allergies, current medications, past family history,  past medical history, past social history, past surgical history and problem list.    Review of Systems   Constitutional: Negative for fatigue and weight loss.   Eyes: Negative for blurred vision.   Cardiovascular: Negative for chest pain.   Endocrine: Negative for polydipsia, polyphagia and polyuria.   Genitourinary: Negative for impotence.   Skin: Negative for pallor.   Neurological: Negative for dizziness, tremors, seizures, speech difficulty, weakness and headaches.   Psychiatric/Behavioral: Negative for confusion. The patient is not nervous/anxious.        Objective  Vitals:    12/19/19 0758   BP: 126/72   Pulse: 73   Temp: 97.9 °F (36.6 °C)   SpO2: 98%        Physical Exam   Constitutional: She is oriented to person, place, and time. She appears well-developed and well-nourished. No distress.   HENT:   Head: Normocephalic.   Nose: Nose normal.   Eyes: EOM are normal. No scleral icterus.   Pulmonary/Chest: Effort normal. No respiratory distress.   Musculoskeletal: Normal range of motion.    Lou had a diabetic foot exam performed today.   During the foot exam she had a monofilament test performed.    Neurological Sensory Findings - Unaltered hot/cold right ankle/foot discrimination and unaltered hot/cold left ankle/foot discrimination. Unaltered sharp/dull right ankle/foot discrimination and unaltered sharp/dull left ankle/foot discrimination.  Vascular Status -  Her right foot exhibits normal foot vasculature . Her left foot exhibits normal foot vasculature .  Skin Integrity  -  Her right foot skin is intact.  She has no right foot onychomycosis.Her left foot skin is intact. She has no left foot onychomycosis..   Foot Structure and Biomechanics -  Her right foot has no hallux valgus and no pes cavus present. Her left foot has no hallux valgus and no pes cavus.  Neurological: She is alert and oriented to person, place, and time.   Skin: Skin is warm and dry. No rash noted.   Psychiatric: She has a normal  mood and affect. Her behavior is normal. Judgment and thought content normal.   Nursing note and vitals reviewed.        Current Outpatient Medications:   •  albuterol sulfate  (90 Base) MCG/ACT inhaler, Inhale 2 puffs Every 4 (Four) Hours As Needed for Wheezing., Disp: 1 inhaler, Rfl: 1  •  atorvastatin (LIPITOR) 20 MG tablet, Take 1 tablet by mouth Daily., Disp: 90 tablet, Rfl: 3  •  cetirizine (zyrTEC) 10 MG tablet, Take 10 mg by mouth Daily., Disp: , Rfl:   •  Cholecalciferol (VITAMIN D3) 5000 units capsule capsule, Take 5,000 Units by mouth Daily., Disp: , Rfl:   •  Empagliflozin (JARDIANCE) 10 MG tablet, Take 10 mg by mouth Daily., Disp: 90 tablet, Rfl: 3  •  metFORMIN (GLUCOPHAGE) 500 MG tablet, Take 1 tablet by mouth 2 (Two) Times a Day With Meals., Disp: 180 tablet, Rfl: 3  •  montelukast (SINGULAIR) 10 MG tablet, Take 1 tablet by mouth Every Night., Disp: 90 tablet, Rfl: 3  •  omeprazole (priLOSEC) 20 MG capsule, Take 20 mg by mouth Daily As Needed., Disp: , Rfl:   •  ibuprofen (ADVIL,MOTRIN) 800 MG tablet, , Disp: , Rfl:     Procedures    Lab Results (most recent)     None        Office Visit on 12/19/2019   Component Date Value Ref Range Status   • Microalbumin, Urine 12/19/2019 10   Final   • Creatinine, Urine 12/19/2019 100   Final   • Color 12/19/2019 Yellow  Yellow, Straw, Dark Yellow, [place] Final   • Clarity, UA 12/19/2019 Clear  Clear Final   • Specific Gravity  12/19/2019 1.020  1.005 - 1.030 Final   • pH, Urine 12/19/2019 5.5  5.0 - 8.0 Final   • Leukocytes 12/19/2019 Negative  Negative Final   • Nitrite, UA 12/19/2019 Negative  Negative Final   • Protein, POC 12/19/2019 Negative  Negative mg/dL Final   • Glucose, UA 12/19/2019 500 mg/dL* Negative, 1000 mg/dL (3+) mg/dL Final   • Ketones, UA 12/19/2019 Negative  Negative Final   • Urobilinogen, UA 12/19/2019 Normal  Normal Final   • Bilirubin 12/19/2019 Negative  Negative Final   • Blood, UA 12/19/2019 Negative  Negative Final            Lou was seen today for diabetes mellitus.    Diagnoses and all orders for this visit:    Type 2 diabetes mellitus without complication, without long-term current use of insulin (CMS/[facility])  -     Hemoglobin A1c  -     POCT microalbumin  -     POC Urinalysis Dipstick, Automated      Type 2 diabetes previously not well controlled.  She has had a series of steroid injections for oral implants with dental work.  Concerned that this will cause her A1c to be higher.  She has been losing weight, lost about 20 pounds, feeling very good.  Most fasting blood sugars in the morning around 100 and rise to 120 mid morning.  She does not want to change medication at this time.  As she has had medications that would raise her blood sugar will wait to change medication if HbA1c is not improving.  Follow-up in 3 months.    Return if symptoms worsen or fail to improve.      Caroline Gomes MD

## 2019-12-20 LAB — HBA1C MFR BLD: 7.3 % (ref 4.8–5.6)

## 2019-12-20 NOTE — PROGRESS NOTES
Please call patient back with results.  The labs has resulted as improved hemoglobin A1c to 7.3.  I am very encouraged by this, keep up the good work!  the microalbumin looks normal as well.  No proteinuria, and glucose appropriately positive with current medications and urine.  Otherwise follow-up for hemoglobin A1c in 3 months.  Thank you

## 2020-03-17 ENCOUNTER — TELEPHONE (OUTPATIENT)
Dept: FAMILY MEDICINE CLINIC | Facility: CLINIC | Age: 65
End: 2020-03-17

## 2020-03-17 DIAGNOSIS — E78.2 MIXED HYPERLIPIDEMIA: ICD-10-CM

## 2020-03-17 DIAGNOSIS — E11.9 TYPE 2 DIABETES MELLITUS WITHOUT COMPLICATION, WITHOUT LONG-TERM CURRENT USE OF INSULIN (HCC): ICD-10-CM

## 2020-03-17 DIAGNOSIS — J45.20 MILD INTERMITTENT REACTIVE AIRWAY DISEASE WITHOUT COMPLICATION: ICD-10-CM

## 2020-03-17 NOTE — TELEPHONE ENCOUNTER
Thank you for making me aware.  Due to the current coronavirus outbreak I recommend that she be quarantined at home for 14 days in order to lower risk of spreading potential infection to other patients.  For now supportive care, fluids.  I would recommend that she ask family members to go to the grocery store for her.

## 2020-03-17 NOTE — TELEPHONE ENCOUNTER
She has an appointment on Thursday.       Pt does have a slight cough that she states is due to allergiers. She also had minor oral surgery last week and she did have a slight fever for 2 days but not any longer

## 2020-03-18 RX ORDER — MONTELUKAST SODIUM 10 MG/1
10 TABLET ORAL NIGHTLY
Qty: 90 TABLET | Refills: 3 | Status: SHIPPED | OUTPATIENT
Start: 2020-03-18 | End: 2021-04-22 | Stop reason: SDUPTHER

## 2020-03-18 RX ORDER — ALBUTEROL SULFATE 90 UG/1
2 AEROSOL, METERED RESPIRATORY (INHALATION) EVERY 4 HOURS PRN
Qty: 1 INHALER | Refills: 1 | Status: SHIPPED | OUTPATIENT
Start: 2020-03-18 | End: 2022-09-02

## 2020-03-18 RX ORDER — ATORVASTATIN CALCIUM 20 MG/1
20 TABLET, FILM COATED ORAL DAILY
Qty: 90 TABLET | Refills: 3 | Status: SHIPPED | OUTPATIENT
Start: 2020-03-18 | End: 2020-07-01 | Stop reason: SDUPTHER

## 2020-03-20 ENCOUNTER — TELEPHONE (OUTPATIENT)
Dept: FAMILY MEDICINE CLINIC | Facility: CLINIC | Age: 65
End: 2020-03-20

## 2020-03-20 NOTE — TELEPHONE ENCOUNTER
Dr. Madie Mukherjee did not answer this before she left. Do you think you could advise pt of anything?

## 2020-03-20 NOTE — TELEPHONE ENCOUNTER
Pt states every year she gets bronchitis. She thinks she has it now. She had an appt scheduled for yesterday but had to cancel due to Dr. Gomes wanting her to stay home for 14 days. She states she has a deep cough and congestion. She has been using her inhaler for SOB. She has not had a fever at all and answers no to other questions. She wanted to come to have her lungs listened to but I advised her she cannot come to our office with those symptoms. Pt is asking what she is suppose to do and thinks she needs to be treated for this. Please advise.

## 2020-03-20 NOTE — TELEPHONE ENCOUNTER
Called and spoke to patient and told her what cecilia said. Patient said that she wanted steroid and was advised to go the [facility]

## 2020-03-20 NOTE — TELEPHONE ENCOUNTER
She needs to continue with her inhaler as needed push fluids and rest and stay home and quarantine herself for 2 weeks.  If the shortness of breath gets worse if she gets chest pain with it and a fever then she will need to go either to the emergency room or 1 of the urgent care centers that has the ability to test for coronavirus just in case

## 2020-04-15 ENCOUNTER — TELEMEDICINE (OUTPATIENT)
Dept: FAMILY MEDICINE CLINIC | Facility: CLINIC | Age: 65
End: 2020-04-15

## 2020-04-15 VITALS — WEIGHT: 175 LBS | BODY MASS INDEX: 27.47 KG/M2 | HEIGHT: 67 IN

## 2020-04-15 DIAGNOSIS — E11.9 TYPE 2 DIABETES MELLITUS WITHOUT COMPLICATION, WITHOUT LONG-TERM CURRENT USE OF INSULIN (HCC): Primary | ICD-10-CM

## 2020-04-15 DIAGNOSIS — N95.2 ATROPHIC VAGINITIS: ICD-10-CM

## 2020-04-15 PROCEDURE — 99214 OFFICE O/P EST MOD 30 MIN: CPT | Performed by: FAMILY MEDICINE

## 2020-04-15 NOTE — PROGRESS NOTES
Lou Kimball is a 64 y.o. female.     Chief Complaint   Patient presents with   • Diabetes Mellitus     follow up    • Skin Problem     patient is still having skin irritation and discharges, she thinks it is due to Jardiance.        HPI     Pt is a pleasant 64 y.o. YO female here for Diabetes and skin issues.      Diabetes Mellitus TII:  controlled.  Patient with chronic diabetes for years.  Asymptomatic without polyuria or polydipsia, no symptoms of hyper or hypoglycemia.  Adherent with medications.  Last hemoglobin A1c of 7.2.  Remaining diabetic screening reviewed.     Vaginal irritation, the skin gets some irritation, no smell or discharge.  Occasionally will dry out and bleed.       The following portions of the patient's history were reviewed and updated as appropriate: allergies, current medications, past family history, past medical history, past social history, past surgical history and problem list.    Review of Systems   Constitutional: Negative.    HENT: Negative.    Eyes: Negative.    Respiratory: Negative.    Cardiovascular: Negative.    Gastrointestinal: Negative.    Endocrine: Negative.    Genitourinary: Positive for genital sores, vaginal discharge and vaginal pain.   Musculoskeletal: Negative.    Skin: Positive for color change and rash.   Allergic/Immunologic: Negative.    Neurological: Negative.    Hematological: Negative.    Psychiatric/Behavioral: Negative.    All other systems reviewed and are negative.      Objective  There were no vitals filed for this visit.     Physical Exam   Constitutional: She appears well-developed and well-nourished. No distress.   Pulmonary/Chest: No respiratory distress.   Psychiatric: She has a normal mood and affect. Her behavior is normal. Judgment and thought content normal.         Current Outpatient Medications:   •  albuterol sulfate  (90 Base) MCG/ACT inhaler, Inhale 2 puffs Every 4 (Four) Hours As Needed for Wheezing., Disp: 1 inhaler, Rfl:  1  •  atorvastatin (LIPITOR) 20 MG tablet, Take 1 tablet by mouth Daily., Disp: 90 tablet, Rfl: 3  •  cetirizine (zyrTEC) 10 MG tablet, Take 10 mg by mouth Daily., Disp: , Rfl:   •  Cholecalciferol (VITAMIN D3) 5000 units capsule capsule, Take 5,000 Units by mouth Daily., Disp: , Rfl:   •  Empagliflozin (Jardiance) 10 MG tablet, Take 10 mg by mouth Daily., Disp: 90 tablet, Rfl: 3  •  ibuprofen (ADVIL,MOTRIN) 800 MG tablet, , Disp: , Rfl:   •  metFORMIN (GLUCOPHAGE) 500 MG tablet, Take 1 tablet by mouth 2 (Two) Times a Day With Meals., Disp: 180 tablet, Rfl: 3  •  montelukast (SINGULAIR) 10 MG tablet, Take 1 tablet by mouth Every Night., Disp: 90 tablet, Rfl: 3  •  omeprazole (priLOSEC) 20 MG capsule, Take 20 mg by mouth Daily As Needed., Disp: , Rfl:     Procedures    Lab Results (most recent)     None              Lou was seen today for diabetes mellitus and skin problem.    Diagnoses and all orders for this visit:    Type 2 diabetes mellitus without complication, without long-term current use of insulin (CMS/[facility])  -     Hemoglobin A1c  -     Vitamin B12 & Folate  -     Lipid Panel  -     Comprehensive Metabolic Panel    Atrophic vaginitis    DM well controlled, continue current meds and FU with labs in 3 months.    Chronic vaginal atrophy that is worsening, no discharge or irritation.  I think it is the vaginal atrophy exacerbated by glucose in the urine from Jardiance.  We discussed changing meds but has she has been doing well on it would like to keep meds the same and FU with GYN first.     Return in about 3 months (around 7/15/2020), or if symptoms worsen or fail to improve, for Recheck Diabetes.      Caroline Gomes MD    This was an audio and video enabled telemedicine encounter secondary to CDC recommendations and patient safety with COVID19 Pandemic.

## 2020-04-30 RX ORDER — METFORMIN HYDROCHLORIDE 500 MG/1
TABLET, EXTENDED RELEASE ORAL
Qty: 180 TABLET | Refills: 3 | Status: SHIPPED | OUTPATIENT
Start: 2020-04-30 | End: 2020-07-21 | Stop reason: SDUPTHER

## 2020-06-08 ENCOUNTER — OFFICE VISIT (OUTPATIENT)
Dept: OBSTETRICS AND GYNECOLOGY | Facility: CLINIC | Age: 65
End: 2020-06-08

## 2020-06-08 VITALS — BODY MASS INDEX: 27.4 KG/M2 | HEIGHT: 67 IN | DIASTOLIC BLOOD PRESSURE: 82 MMHG | SYSTOLIC BLOOD PRESSURE: 130 MMHG

## 2020-06-08 DIAGNOSIS — N95.2 VAGINAL ATROPHY: ICD-10-CM

## 2020-06-08 DIAGNOSIS — Z01.419 ROUTINE GYNECOLOGICAL EXAMINATION: ICD-10-CM

## 2020-06-08 DIAGNOSIS — N89.8 VAGINAL DISCHARGE: Primary | ICD-10-CM

## 2020-06-08 DIAGNOSIS — Z01.419 PAP SMEAR, LOW-RISK: ICD-10-CM

## 2020-06-08 DIAGNOSIS — B37.2 YEAST DERMATITIS: ICD-10-CM

## 2020-06-08 PROCEDURE — 99213 OFFICE O/P EST LOW 20 MIN: CPT | Performed by: OBSTETRICS & GYNECOLOGY

## 2020-06-08 PROCEDURE — G0101 CA SCREEN;PELVIC/BREAST EXAM: HCPCS | Performed by: OBSTETRICS & GYNECOLOGY

## 2020-06-08 RX ORDER — NYSTATIN AND TRIAMCINOLONE ACETONIDE 100000; 1 [USP'U]/G; MG/G
OINTMENT TOPICAL 2 TIMES DAILY
Qty: 60 G | Refills: 0 | Status: SHIPPED | OUTPATIENT
Start: 2020-06-08 | End: 2020-06-15

## 2020-06-08 RX ORDER — FLUCONAZOLE 200 MG/1
200 TABLET ORAL DAILY
Qty: 3 TABLET | Refills: 0 | Status: SHIPPED | OUTPATIENT
Start: 2020-06-08 | End: 2020-06-11

## 2020-06-10 LAB
CYTOLOGIST CVX/VAG CYTO: NORMAL
CYTOLOGY CVX/VAG DOC CYTO: NORMAL
CYTOLOGY CVX/VAG DOC THIN PREP: NORMAL
DX ICD CODE: NORMAL
HIV 1 & 2 AB SER-IMP: NORMAL
OTHER STN SPEC: NORMAL
STAT OF ADQ CVX/VAG CYTO-IMP: NORMAL

## 2020-06-17 LAB
A VAGINAE DNA VAG QL NAA+PROBE: NORMAL SCORE
BVAB2 DNA VAG QL NAA+PROBE: NORMAL SCORE
C ALBICANS DNA VAG QL NAA+PROBE: NEGATIVE
C GLABRATA DNA VAG QL NAA+PROBE: NEGATIVE
M GENITALIUM DNA SPEC QL NAA+PROBE: NEGATIVE
M HOMINIS DNA SPEC QL NAA+PROBE: NEGATIVE
MEGA1 DNA VAG QL NAA+PROBE: NORMAL SCORE
UREAPLASMA DNA SPEC QL NAA+PROBE: NEGATIVE

## 2020-07-01 ENCOUNTER — OFFICE VISIT (OUTPATIENT)
Dept: OBSTETRICS AND GYNECOLOGY | Facility: CLINIC | Age: 65
End: 2020-07-01

## 2020-07-01 VITALS — DIASTOLIC BLOOD PRESSURE: 82 MMHG | SYSTOLIC BLOOD PRESSURE: 144 MMHG | HEIGHT: 67 IN | BODY MASS INDEX: 27.41 KG/M2

## 2020-07-01 DIAGNOSIS — N95.2 VAGINAL ATROPHY: ICD-10-CM

## 2020-07-01 DIAGNOSIS — Z13.9 SCREENING FOR UNSPECIFIED CONDITION: Primary | ICD-10-CM

## 2020-07-01 DIAGNOSIS — B37.2 YEAST DERMATITIS: ICD-10-CM

## 2020-07-01 LAB
BILIRUB BLD-MCNC: NEGATIVE MG/DL
CLARITY, POC: CLEAR
COLOR UR: YELLOW
GLUCOSE UR STRIP-MCNC: ABNORMAL MG/DL
KETONES UR QL: NEGATIVE
LEUKOCYTE EST, POC: NEGATIVE
NITRITE UR-MCNC: NEGATIVE MG/ML
PH UR: 5 [PH] (ref 5–8)
PROT UR STRIP-MCNC: NEGATIVE MG/DL
RBC # UR STRIP: NEGATIVE /UL
SP GR UR: 1.01 (ref 1–1.03)
UROBILINOGEN UR QL: NORMAL

## 2020-07-01 PROCEDURE — 99213 OFFICE O/P EST LOW 20 MIN: CPT | Performed by: OBSTETRICS & GYNECOLOGY

## 2020-07-01 PROCEDURE — 81002 URINALYSIS NONAUTO W/O SCOPE: CPT | Performed by: OBSTETRICS & GYNECOLOGY

## 2020-07-01 RX ORDER — ESTRADIOL 0.1 MG/G
1 CREAM VAGINAL 2 TIMES WEEKLY
Qty: 45 G | Refills: 6 | Status: SHIPPED | OUTPATIENT
Start: 2020-07-02 | End: 2021-06-16 | Stop reason: SDUPTHER

## 2020-07-01 RX ORDER — ATORVASTATIN CALCIUM 20 MG/1
TABLET, FILM COATED ORAL
COMMUNITY
End: 2021-04-22 | Stop reason: SDUPTHER

## 2020-07-02 ENCOUNTER — TELEPHONE (OUTPATIENT)
Dept: FAMILY MEDICINE CLINIC | Facility: CLINIC | Age: 65
End: 2020-07-02

## 2020-07-02 NOTE — TELEPHONE ENCOUNTER
PT CALLED IN STATING THAT SHE HAS A MEDICAL QUESTION ABOUT Empagliflozin (Jardiance) 10 MG tablet.  PT SAYS DR ENRIQUEZ IS GOING TO CHANGE THIS TO AN INJECTABLE AND PT WANTS TO KNOW THE NAME OF THIS MEDICATION SO THAT SHE CAN RESEARCH IT.     PT CALL BACK     PHARMACY: [place] [address]  [phone]

## 2020-07-07 NOTE — TELEPHONE ENCOUNTER
The class of injectables are GLP-1's.  Below is a website from the [facility] that outlines the medication brand options so that she can do further research.  It is not generic, thanks!    [email or web address]    She can also google GLP1 agonists [facility].

## 2020-07-08 ENCOUNTER — TELEPHONE (OUTPATIENT)
Dept: FAMILY MEDICINE CLINIC | Facility: CLINIC | Age: 65
End: 2020-07-08

## 2020-07-16 DIAGNOSIS — E11.9 TYPE 2 DIABETES MELLITUS WITHOUT COMPLICATION, WITHOUT LONG-TERM CURRENT USE OF INSULIN (HCC): Primary | ICD-10-CM

## 2020-07-17 LAB — HBA1C MFR BLD: 8.4 % (ref 4.8–5.6)

## 2020-07-21 ENCOUNTER — OFFICE VISIT (OUTPATIENT)
Dept: FAMILY MEDICINE CLINIC | Facility: CLINIC | Age: 65
End: 2020-07-21

## 2020-07-21 VITALS
BODY MASS INDEX: 27.47 KG/M2 | SYSTOLIC BLOOD PRESSURE: 116 MMHG | DIASTOLIC BLOOD PRESSURE: 78 MMHG | HEIGHT: 67 IN | TEMPERATURE: 98 F | HEART RATE: 78 BPM | WEIGHT: 175 LBS | OXYGEN SATURATION: 98 %

## 2020-07-21 DIAGNOSIS — E11.9 TYPE 2 DIABETES MELLITUS WITHOUT COMPLICATION, WITHOUT LONG-TERM CURRENT USE OF INSULIN (HCC): Primary | ICD-10-CM

## 2020-07-21 DIAGNOSIS — E78.41 ELEVATED LIPOPROTEIN(A): ICD-10-CM

## 2020-07-21 PROCEDURE — 99213 OFFICE O/P EST LOW 20 MIN: CPT | Performed by: FAMILY MEDICINE

## 2020-07-21 RX ORDER — METFORMIN HYDROCHLORIDE 500 MG/1
TABLET, EXTENDED RELEASE ORAL
Qty: 360 TABLET | Refills: 3 | Status: SHIPPED | OUTPATIENT
Start: 2020-07-21 | End: 2021-04-22 | Stop reason: SDUPTHER

## 2020-07-21 NOTE — PROGRESS NOTES
Lou Kimball is a 65 y.o. female.     Chief Complaint   Patient presents with   • Diabetes Mellitus     3 month follow up        Diabetes   She presents for her follow-up diabetic visit. She has type 2 diabetes mellitus. No MedicAlert identification noted. The initial diagnosis of diabetes was made 14 years ago. Pertinent negatives for hypoglycemia include no confusion, dizziness, headaches, hunger, mood changes, nervousness/anxiousness, pallor, seizures, sleepiness, speech difficulty, sweats or tremors. Pertinent negatives for diabetes include no blurred vision, no chest pain, no fatigue, no foot paresthesias, no foot ulcerations, no polydipsia, no polyphagia, no polyuria, no visual change, no weakness and no weight loss. Pertinent negatives for hypoglycemia complications include no blackouts, no hospitalization, no nocturnal hypoglycemia, no required assistance and no required glucagon injection. Symptoms are stable. Pertinent negatives for diabetic complications include no CVA, heart disease, impotence, nephropathy, peripheral neuropathy, PVD or retinopathy. Risk factors for coronary artery disease include dyslipidemia. Current diabetic treatment includes oral agent (dual therapy). She is compliant with treatment all of the time. Rotation sites for injection include the arms. Her weight is fluctuating minimally. She is following a generally healthy diet. Meal planning includes ADA exchanges. She has not had a previous visit with a dietitian. She participates in exercise daily. She monitors blood glucose at home 1-2 x per week. She monitors urine at home <1 x per month. Blood glucose monitoring compliance is excellent. There is no change in her home blood glucose trend. Her breakfast blood glucose is taken between 5-6 am. Her breakfast blood glucose range is generally 70-90 mg/dl. Her highest blood glucose is 110-130 mg/dl. Her overall blood glucose range is 110-130 mg/dl. She does not see a podiatrist.Eye  exam is current.          The following portions of the patient's history were reviewed and updated as appropriate: allergies, current medications, past family history, past medical history, past social history, past surgical history and problem list.    Review of Systems   Constitutional: Negative for fatigue and weight loss.   Eyes: Negative for blurred vision.   Cardiovascular: Negative for chest pain.   Endocrine: Negative for polydipsia, polyphagia and polyuria.   Genitourinary: Negative for impotence.   Skin: Negative for pallor.   Neurological: Negative for dizziness, tremors, seizures, speech difficulty, weakness and headaches.   Psychiatric/Behavioral: Negative for confusion. The patient is not nervous/anxious.        Objective  Vitals:    07/21/20 0752   BP: 116/78   Pulse: 78   Temp: 98 °F (36.7 °C)   SpO2: 98%        Physical Exam   Constitutional: She is oriented to person, place, and time. She appears well-developed and well-nourished. No distress.   HENT:   Head: Normocephalic.   Nose: Nose normal.   Eyes: EOM are normal.   Cardiovascular: Normal rate, regular rhythm, normal heart sounds and intact distal pulses.   No murmur heard.  Pulmonary/Chest: Effort normal and breath sounds normal. No respiratory distress.   Musculoskeletal: Normal range of motion.   Neurological: She is alert and oriented to person, place, and time.   Skin: Skin is warm and dry. No rash noted.   Psychiatric: She has a normal mood and affect. Her behavior is normal. Judgment and thought content normal.   Nursing note and vitals reviewed.        Current Outpatient Medications:   •  albuterol sulfate  (90 Base) MCG/ACT inhaler, Inhale 2 puffs Every 4 (Four) Hours As Needed for Wheezing., Disp: 1 inhaler, Rfl: 1  •  atorvastatin (LIPITOR) 20 MG tablet, , Disp: , Rfl:   •  Cholecalciferol (VITAMIN D3) 5000 units capsule capsule, Take 5,000 Units by mouth Daily., Disp: , Rfl:   •  estradiol (ESTRACE) 0.1 MG/GM vaginal cream,  Insert 1 g into the vagina 2 (Two) Times a Week., Disp: 45 g, Rfl: 6  •  metFORMIN ER (GLUCOPHAGE-XR) 500 MG 24 hr tablet, 1000 mg in the AM and PM, Disp: 360 tablet, Rfl: 3  •  montelukast (SINGULAIR) 10 MG tablet, Take 1 tablet by mouth Every Night., Disp: 90 tablet, Rfl: 3  •  omeprazole (priLOSEC) 20 MG capsule, Take 20 mg by mouth Daily As Needed., Disp: , Rfl:   •  Empagliflozin (Jardiance) 25 MG tablet, Take 25 mg by mouth Daily., Disp: 90 tablet, Rfl: 3    Procedures    Lab Results (most recent)     None              Lou was seen today for diabetes mellitus.    Diagnoses and all orders for this visit:    Type 2 diabetes mellitus without complication, without long-term current use of insulin (CMS/[facility])  -     metFORMIN ER (GLUCOPHAGE-XR) 500 MG 24 hr tablet; 1000 mg in the AM and PM  -     Empagliflozin (Jardiance) 25 MG tablet; Take 25 mg by mouth Daily.  -     Hemoglobin A1c  -     Vitamin B12 & Folate  -     Lipid Panel  -     Comprehensive Metabolic Panel  -     TSH  -     CBC & Differential    Elevated lipoprotein(a)  -     Lipid Panel  -     CBC & Differential      Type 2 diabetes not well controlled.  She is currently adherent with metformin 500 mg daily and Jardiance 10 mg daily.  She has some issues with metformin intolerance -will gradually increase to 1000 mg twice daily, increase Jardiance from 10 to 25 mg.  Goal hemoglobin A1c less than 7.  Likely related to increased diet with high glycemic foods.  She has already made changes to return to a low carb diet less than 30 g of carbs a day.  She otherwise feels well.  Plan to follow-up in 3 months with fasting labs prior.    Return in about 3 months (around 10/21/2020), or if symptoms worsen or fail to improve, for Recheck Diabetes according to pts availbility .      Caroline Gomes MD

## 2020-09-29 LAB
ALBUMIN SERPL-MCNC: 4.9 G/DL (ref 3.5–5.2)
ALBUMIN/GLOB SERPL: 2.6 G/DL
ALP SERPL-CCNC: 103 U/L (ref 39–117)
ALT SERPL-CCNC: 20 U/L (ref 1–33)
AST SERPL-CCNC: 14 U/L (ref 1–32)
BASOPHILS # BLD AUTO: 0.08 10*3/MM3 (ref 0–0.2)
BASOPHILS NFR BLD AUTO: 1.1 % (ref 0–1.5)
BILIRUB SERPL-MCNC: 0.8 MG/DL (ref 0–1.2)
BUN SERPL-MCNC: 12 MG/DL (ref 8–23)
BUN/CREAT SERPL: 15 (ref 7–25)
CALCIUM SERPL-MCNC: 9.6 MG/DL (ref 8.6–10.5)
CHLORIDE SERPL-SCNC: 102 MMOL/L (ref 98–107)
CHOLEST SERPL-MCNC: 170 MG/DL (ref 0–200)
CO2 SERPL-SCNC: 23 MMOL/L (ref 22–29)
CREAT SERPL-MCNC: 0.8 MG/DL (ref 0.57–1)
EOSINOPHIL # BLD AUTO: 0.14 10*3/MM3 (ref 0–0.4)
EOSINOPHIL NFR BLD AUTO: 1.9 % (ref 0.3–6.2)
ERYTHROCYTE [DISTWIDTH] IN BLOOD BY AUTOMATED COUNT: 13.5 % (ref 12.3–15.4)
FOLATE SERPL-MCNC: 12.3 NG/ML (ref 4.78–24.2)
GLOBULIN SER CALC-MCNC: 1.9 GM/DL
GLUCOSE SERPL-MCNC: 150 MG/DL (ref 65–99)
HBA1C MFR BLD: 7.5 % (ref 4.8–5.6)
HCT VFR BLD AUTO: 48.5 % (ref 34–46.6)
HDLC SERPL-MCNC: 53 MG/DL (ref 40–60)
HGB BLD-MCNC: 16.2 G/DL (ref 12–15.9)
IMM GRANULOCYTES # BLD AUTO: 0.02 10*3/MM3 (ref 0–0.05)
IMM GRANULOCYTES NFR BLD AUTO: 0.3 % (ref 0–0.5)
LDLC SERPL CALC-MCNC: 84 MG/DL (ref 0–100)
LYMPHOCYTES # BLD AUTO: 1.62 10*3/MM3 (ref 0.7–3.1)
LYMPHOCYTES NFR BLD AUTO: 22.5 % (ref 19.6–45.3)
MCH RBC QN AUTO: 28.7 PG (ref 26.6–33)
MCHC RBC AUTO-ENTMCNC: 33.4 G/DL (ref 31.5–35.7)
MCV RBC AUTO: 86 FL (ref 79–97)
MONOCYTES # BLD AUTO: 0.61 10*3/MM3 (ref 0.1–0.9)
MONOCYTES NFR BLD AUTO: 8.5 % (ref 5–12)
NEUTROPHILS # BLD AUTO: 4.74 10*3/MM3 (ref 1.7–7)
NEUTROPHILS NFR BLD AUTO: 65.7 % (ref 42.7–76)
NRBC BLD AUTO-RTO: 0 /100 WBC (ref 0–0.2)
PLATELET # BLD AUTO: 330 10*3/MM3 (ref 140–450)
POTASSIUM SERPL-SCNC: 3.8 MMOL/L (ref 3.5–5.2)
PROT SERPL-MCNC: 6.8 G/DL (ref 6–8.5)
RBC # BLD AUTO: 5.64 10*6/MM3 (ref 3.77–5.28)
SODIUM SERPL-SCNC: 138 MMOL/L (ref 136–145)
TRIGL SERPL-MCNC: 167 MG/DL (ref 0–150)
TSH SERPL DL<=0.005 MIU/L-ACNC: 1.39 UIU/ML (ref 0.27–4.2)
VIT B12 SERPL-MCNC: 325 PG/ML (ref 211–946)
VLDLC SERPL CALC-MCNC: 33.4 MG/DL
WBC # BLD AUTO: 7.21 10*3/MM3 (ref 3.4–10.8)

## 2020-10-07 ENCOUNTER — OFFICE VISIT (OUTPATIENT)
Dept: FAMILY MEDICINE CLINIC | Facility: CLINIC | Age: 65
End: 2020-10-07

## 2020-10-07 VITALS
HEIGHT: 67 IN | SYSTOLIC BLOOD PRESSURE: 112 MMHG | DIASTOLIC BLOOD PRESSURE: 74 MMHG | BODY MASS INDEX: 27.5 KG/M2 | OXYGEN SATURATION: 99 % | HEART RATE: 75 BPM | WEIGHT: 175.2 LBS | TEMPERATURE: 97.1 F

## 2020-10-07 DIAGNOSIS — Z12.31 ENCOUNTER FOR SCREENING MAMMOGRAM FOR MALIGNANT NEOPLASM OF BREAST: ICD-10-CM

## 2020-10-07 DIAGNOSIS — Z00.00 MEDICARE ANNUAL WELLNESS VISIT, SUBSEQUENT: Primary | ICD-10-CM

## 2020-10-07 DIAGNOSIS — Z23 NEED FOR PNEUMOCOCCAL VACCINATION: ICD-10-CM

## 2020-10-07 PROCEDURE — G0439 PPPS, SUBSEQ VISIT: HCPCS | Performed by: FAMILY MEDICINE

## 2020-10-07 RX ORDER — ASPIRIN 81 MG/1
81 TABLET, CHEWABLE ORAL DAILY
COMMUNITY

## 2020-10-07 NOTE — PROGRESS NOTES
The ABCs of the Annual Wellness Visit  Subsequent Medicare Wellness Visit    Chief Complaint   Patient presents with   • Medicare Wellness-subsequent     no complains i wore face shield and mask        Subjective   History of Present Illness:  Lou Kimball is a 65 y.o. female who presents for a Subsequent Medicare Wellness Visit.    HEALTH RISK ASSESSMENT    Recent Hospitalizations:  No hospitalization(s) within the last year.    Current Medical Providers:  Patient Care Team:  Caroline Gomes MD as PCP - General (Family Medicine)  Caroline Gomes MD as PCP - Claims Attributed    Smoking Status:  Social History     Tobacco Use   Smoking Status Never Smoker   Smokeless Tobacco Never Used       Alcohol Consumption:  Social History     Substance and Sexual Activity   Alcohol Use No       Depression Screen:   PHQ-2/PHQ-9 Depression Screening 10/7/2020   Little interest or pleasure in doing things 0   Feeling down, depressed, or hopeless 0   Total Score 0       Fall Risk Screen:  STEADI Fall Risk Assessment has not been completed.    Health Habits and Functional and Cognitive Screening:  Functional & Cognitive Status 10/7/2020   Do you have difficulty preparing food and eating? No   Do you have difficulty bathing yourself, getting dressed or grooming yourself? No   Do you have difficulty using the toilet? No   Do you have difficulty moving around from place to place? No   Do you have trouble with steps or getting out of a bed or a chair? No   Current Diet (No Data)        Current Diet Comment wlel balance   Dental Exam -   Eye Exam -   Exercise (times per week) -   Current Exercise Activities Include -   Do you need help using the phone?  No   Are you deaf or do you have serious difficulty hearing?  No   Do you need help with transportation? No   Do you need help shopping? -   Do you need help preparing meals?  No   Do you need help with housework?  No   Do you need help with laundry? No   Do you need help taking your  medications? -   Do you need help managing money? No   Do you ever drive or ride in a car without wearing a seat belt? No   Have you felt unusual stress, anger or loneliness in the last month? No   Who do you live with? Spouse   If you need help, do you have trouble finding someone available to you? (No Data)   Have you been bothered in the last four weeks by sexual problems? -   Do you have difficulty concentrating, remembering or making decisions? No         Does the patient have evidence of cognitive impairment? No    Asprin use counseling:Does not need ASA (and currently is not on it)    Age-appropriate Screening Schedule:  Refer to the list below for future screening recommendations based on patient's age, sex and/or medical conditions. Orders for these recommended tests are listed in the plan section. The patient has been provided with a written plan.    Health Maintenance   Topic Date Due   • DIABETIC EYE EXAM  11/22/2020   • DIABETIC FOOT EXAM  12/19/2020   • URINE MICROALBUMIN  12/19/2020   • COLONOSCOPY  01/01/2021   • HEMOGLOBIN A1C  03/28/2021   • MAMMOGRAM  09/09/2021   • LIPID PANEL  09/28/2021   • TDAP/TD VACCINES (2 - Td) 03/27/2029   • INFLUENZA VACCINE  Completed   • ZOSTER VACCINE  Completed          The following portions of the patient's history were reviewed and updated as appropriate: allergies, current medications, past family history, past medical history, past social history, past surgical history and problem list.    Outpatient Medications Prior to Visit   Medication Sig Dispense Refill   • aspirin 81 MG chewable tablet Chew 81 mg Daily.     • albuterol sulfate  (90 Base) MCG/ACT inhaler Inhale 2 puffs Every 4 (Four) Hours As Needed for Wheezing. 1 inhaler 1   • atorvastatin (LIPITOR) 20 MG tablet      • Cholecalciferol (VITAMIN D3) 5000 units capsule capsule Take 5,000 Units by mouth Daily.     • Empagliflozin (Jardiance) 25 MG tablet Take 25 mg by mouth Daily. 90 tablet 3   •  "estradiol (ESTRACE) 0.1 MG/GM vaginal cream Insert 1 g into the vagina 2 (Two) Times a Week. 45 g 6   • metFORMIN ER (GLUCOPHAGE-XR) 500 MG 24 hr tablet 1000 mg in the AM and  tablet 3   • montelukast (SINGULAIR) 10 MG tablet Take 1 tablet by mouth Every Night. 90 tablet 3   • omeprazole (priLOSEC) 20 MG capsule Take 20 mg by mouth Daily As Needed.       No facility-administered medications prior to visit.        Patient Active Problem List   Diagnosis   • Type 2 diabetes mellitus without complication, without long-term current use of insulin (CMS/[facility])   • Hyperlipidemia   • Environmental allergies   • Reactive airway disease without complication   • GERD (gastroesophageal reflux disease)       Advanced Care Planning:  ACP discussion was held with the patient during this visit. Patient has an advance directive (not in EMR), copy requested.    Review of Systems    Compared to one year ago, the patient feels her physical health is the same.  Compared to one year ago, the patient feels her mental health is worse.    Reviewed chart for potential of high risk medication in the elderly: yes  Reviewed chart for potential of harmful drug interactions in the elderly:yes    Objective         Vitals:    10/07/20 0811   BP: 112/74   Pulse: 75   Temp: 97.1 °F (36.2 °C)   SpO2: 99%   Weight: 79.5 kg (175 lb 3.2 oz)   Height: 170.2 cm (67\")   PainSc: 0-No pain       Body mass index is 27.44 kg/m².  Discussed the patient's BMI with her. The BMI is above average; BMI management plan is completed.    Physical Exam  Vitals signs and nursing note reviewed.   Constitutional:       General: She is not in acute distress.     Appearance: She is well-developed.   HENT:      Head: Normocephalic.      Nose: Nose normal.   Cardiovascular:      Rate and Rhythm: Normal rate and regular rhythm.      Heart sounds: Normal heart sounds. No murmur.   Pulmonary:      Effort: Pulmonary effort is normal. No respiratory distress.      Breath " sounds: Normal breath sounds.   Musculoskeletal: Normal range of motion.   Skin:     General: Skin is warm and dry.      Findings: No rash.   Neurological:      Mental Status: She is alert and oriented to person, place, and time.   Psychiatric:         Behavior: Behavior normal.         Thought Content: Thought content normal.         Judgment: Judgment normal.         Lab Results   Component Value Date     (H) 09/28/2020    CHLPL 170 09/28/2020    TRIG 167 (H) 09/28/2020    HDL 53 09/28/2020    LDL 84 09/28/2020    VLDL 33.4 09/28/2020    HGBA1C 7.50 (H) 09/28/2020        Assessment/Plan   Medicare Risks and Personalized Health Plan  CMS Preventative Services Quick Reference  Advance Directive Discussion  Breast Cancer/Mammogram Screening  Dementia/Memory   Depression/Dysphoria  Immunizations Discussed/Encouraged (specific immunizations; Pneumococcal 23 )  Inadequate Social Support, Isolation, Loneliness, Lack of Transportation, Financial Difficulties, or Caregiver Stress   Obesity/Overweight     The above risks/problems have been discussed with the patient.  Pertinent information has been shared with the patient in the After Visit Summary.  Follow up plans and orders are seen below in the Assessment/Plan Section.    Diagnoses and all orders for this visit:    1. Medicare annual wellness visit, subsequent (Primary)    2. Encounter for screening mammogram for malignant neoplasm of breast  -     Mammo Screening Bilateral With CAD; Future    3. Need for pneumococcal vaccination  -     pneumococcal polysaccharide 23-valent (PNEUMOVAX-23) vaccine 0.5 mL    Patient will continue with eating healthy, exercising, continue with medications as above, hemoglobin A1c seems to be improving, recently started the full dose of Jardiance, she was taking 2 times previously and is now taking 25 mg daily.  Okay to follow-up for diabetes in December and then in April according to her travel schedule.  Pneumonia vaccine today,  flu shot up-to-date.  Due for mammogram as above.  Colon cancer screening up-to-date.    Follow Up:  Return in 3 months (on 1/7/2021), or if symptoms worsen or fail to improve.     An After Visit Summary and PPPS were given to the patient.       The 10-year ASCVD risk score (Krystle ABAD Jr., et al., 2013) is: 9.5%    Values used to calculate the score:      Age: 65 years      Sex: Female      Is Non- : No      Diabetic: Yes      Tobacco smoker: No      Systolic Blood Pressure: 112 mmHg      Is BP treated: No      HDL Cholesterol: 53 mg/dL      Total Cholesterol: 170 mg/dL        Answers for HPI/ROS submitted by the patient on 9/30/2020   What is the primary reason for your visit?: Other  Please describe your symptoms.: None, Annual exam  Have you had these symptoms before?: No  How long have you been having these symptoms?: 1-4 days  Please list any medications you are currently taking for this condition.: None  Please describe any probable cause for these symptoms. : None

## 2020-10-20 ENCOUNTER — HOSPITAL ENCOUNTER (OUTPATIENT)
Dept: MAMMOGRAPHY | Facility: HOSPITAL | Age: 65
Discharge: HOME | End: 2020-10-20
Admitting: FAMILY MEDICINE

## 2020-10-20 DIAGNOSIS — Z12.31 ENCOUNTER FOR SCREENING MAMMOGRAM FOR MALIGNANT NEOPLASM OF BREAST: ICD-10-CM

## 2020-10-20 PROCEDURE — 77067 SCR MAMMO BI INCL CAD: CPT

## 2020-10-20 PROCEDURE — 77063 BREAST TOMOSYNTHESIS BI: CPT

## 2020-12-15 DIAGNOSIS — R79.9 ABNORMAL BLOOD CELL COUNT: ICD-10-CM

## 2020-12-15 DIAGNOSIS — E11.9 TYPE 2 DIABETES MELLITUS WITHOUT COMPLICATION, WITHOUT LONG-TERM CURRENT USE OF INSULIN (HCC): Primary | ICD-10-CM

## 2020-12-17 LAB
BASOPHILS # BLD AUTO: 0.07 10*3/MM3 (ref 0–0.2)
BASOPHILS NFR BLD AUTO: 1 % (ref 0–1.5)
EOSINOPHIL # BLD AUTO: 0.15 10*3/MM3 (ref 0–0.4)
EOSINOPHIL NFR BLD AUTO: 2.2 % (ref 0.3–6.2)
ERYTHROCYTE [DISTWIDTH] IN BLOOD BY AUTOMATED COUNT: 13.5 % (ref 12.3–15.4)
HBA1C MFR BLD: 7.26 % (ref 4.8–5.6)
HCT VFR BLD AUTO: 46.5 % (ref 34–46.6)
HGB BLD-MCNC: 15.8 G/DL (ref 12–15.9)
IMM GRANULOCYTES # BLD AUTO: 0.03 10*3/MM3 (ref 0–0.05)
IMM GRANULOCYTES NFR BLD AUTO: 0.4 % (ref 0–0.5)
LYMPHOCYTES # BLD AUTO: 1.56 10*3/MM3 (ref 0.7–3.1)
LYMPHOCYTES NFR BLD AUTO: 23.1 % (ref 19.6–45.3)
MCH RBC QN AUTO: 28.9 PG (ref 26.6–33)
MCHC RBC AUTO-ENTMCNC: 34 G/DL (ref 31.5–35.7)
MCV RBC AUTO: 85.2 FL (ref 79–97)
MONOCYTES # BLD AUTO: 0.51 10*3/MM3 (ref 0.1–0.9)
MONOCYTES NFR BLD AUTO: 7.5 % (ref 5–12)
NEUTROPHILS # BLD AUTO: 4.44 10*3/MM3 (ref 1.7–7)
NEUTROPHILS NFR BLD AUTO: 65.8 % (ref 42.7–76)
NRBC BLD AUTO-RTO: 0 /100 WBC (ref 0–0.2)
PLATELET # BLD AUTO: 329 10*3/MM3 (ref 140–450)
RBC # BLD AUTO: 5.46 10*6/MM3 (ref 3.77–5.28)
WBC # BLD AUTO: 6.76 10*3/MM3 (ref 3.4–10.8)

## 2020-12-21 ENCOUNTER — OFFICE VISIT (OUTPATIENT)
Dept: FAMILY MEDICINE CLINIC | Facility: CLINIC | Age: 65
End: 2020-12-21

## 2020-12-21 VITALS
SYSTOLIC BLOOD PRESSURE: 122 MMHG | TEMPERATURE: 97.7 F | WEIGHT: 175.2 LBS | HEIGHT: 67 IN | BODY MASS INDEX: 27.5 KG/M2 | HEART RATE: 80 BPM | DIASTOLIC BLOOD PRESSURE: 76 MMHG | OXYGEN SATURATION: 99 %

## 2020-12-21 DIAGNOSIS — E11.9 TYPE 2 DIABETES MELLITUS WITHOUT COMPLICATION, WITHOUT LONG-TERM CURRENT USE OF INSULIN (HCC): Primary | ICD-10-CM

## 2020-12-21 DIAGNOSIS — H92.01 EAR PAIN, RIGHT: ICD-10-CM

## 2020-12-21 DIAGNOSIS — R71.8 ELEVATED HEMATOCRIT: ICD-10-CM

## 2020-12-21 PROCEDURE — 99214 OFFICE O/P EST MOD 30 MIN: CPT | Performed by: FAMILY MEDICINE

## 2020-12-21 NOTE — PROGRESS NOTES
Lou Kimball is a 65 y.o. female.     Chief Complaint   Patient presents with   • Diabetes Mellitus     3 month follow up    • Ear Fullness     sometimes feel fullness and very itchty        Diabetes  She presents for her follow-up diabetic visit. She has type 2 diabetes mellitus. No MedicAlert identification noted. The initial diagnosis of diabetes was made 13 years ago. Pertinent negatives for hypoglycemia include no confusion, dizziness, headaches, hunger, mood changes, nervousness/anxiousness, pallor, seizures, sleepiness, speech difficulty or sweats. Pertinent negatives for diabetes include no blurred vision, no chest pain, no fatigue, no foot paresthesias, no foot ulcerations, no polydipsia, no polyphagia, no polyuria, no visual change, no weakness and no weight loss. Pertinent negatives for hypoglycemia complications include no blackouts, no hospitalization, no nocturnal hypoglycemia, no required assistance and no required glucagon injection. Symptoms are stable. Pertinent negatives for diabetic complications include no CVA, heart disease, impotence, nephropathy, peripheral neuropathy, PVD or retinopathy. Risk factors for coronary artery disease include family history and post-menopausal. Current diabetic treatment includes diet and oral agent (dual therapy). She is compliant with treatment most of the time. Her weight is stable. She is following a generally healthy and high fat/cholesterol diet. Meal planning includes ADA exchanges. She has not had a previous visit with a dietitian. She participates in exercise daily. Her home blood glucose trend is fluctuating minimally. She does not see a podiatrist.Eye exam is current.        Elevate hematocrit improving but still with upper limit of normal.  She stopped baby aspirin.  No adverse effects.  Reviewed labs with patient.    Patient with right ear discomfort, occasional, periodic.  Has some itching and redness.    The following portions of the patient's  history were reviewed and updated as appropriate: allergies, current medications, past family history, past medical history, past social history, past surgical history and problem list.    Review of Systems   Constitutional: Negative for fatigue and weight loss.   HENT: Positive for congestion and ear pain.    Eyes: Negative for blurred vision.   Cardiovascular: Negative for chest pain.   Endocrine: Negative for polydipsia, polyphagia and polyuria.   Genitourinary: Negative for impotence.   Skin: Negative for pallor.   Allergic/Immunologic: Positive for environmental allergies.   Neurological: Negative for dizziness, seizures, speech difficulty, weakness and headaches.   Psychiatric/Behavioral: Negative for confusion. The patient is not nervous/anxious.        Objective  Vitals:    12/21/20 0820   BP: 122/76   Pulse: 80   Temp: 97.7 °F (36.5 °C)   SpO2: 99%        Physical Exam  Vitals signs and nursing note reviewed.   Constitutional:       General: She is not in acute distress.     Appearance: She is well-developed.   HENT:      Head: Normocephalic.      Right Ear: Tympanic membrane and ear canal normal.      Left Ear: Tympanic membrane and ear canal normal.      Nose: Nose normal.   Eyes:      General: No scleral icterus.  Pulmonary:      Effort: Pulmonary effort is normal. No respiratory distress.   Musculoskeletal: Normal range of motion.   Skin:     General: Skin is warm and dry.      Findings: No rash.   Neurological:      Mental Status: She is alert and oriented to person, place, and time.   Psychiatric:         Behavior: Behavior normal.         Thought Content: Thought content normal.         Judgment: Judgment normal.           Current Outpatient Medications:   •  albuterol sulfate  (90 Base) MCG/ACT inhaler, Inhale 2 puffs Every 4 (Four) Hours As Needed for Wheezing., Disp: 1 inhaler, Rfl: 1  •  aspirin 81 MG chewable tablet, Chew 81 mg Daily., Disp: , Rfl:   •  atorvastatin (LIPITOR) 20 MG  tablet, , Disp: , Rfl:   •  Cholecalciferol (VITAMIN D3) 5000 units capsule capsule, Take 5,000 Units by mouth Daily., Disp: , Rfl:   •  Empagliflozin (Jardiance) 25 MG tablet, Take 25 mg by mouth Daily., Disp: 90 tablet, Rfl: 3  •  estradiol (ESTRACE) 0.1 MG/GM vaginal cream, Insert 1 g into the vagina 2 (Two) Times a Week., Disp: 45 g, Rfl: 6  •  metFORMIN ER (GLUCOPHAGE-XR) 500 MG 24 hr tablet, 1000 mg in the AM and PM, Disp: 360 tablet, Rfl: 3  •  montelukast (SINGULAIR) 10 MG tablet, Take 1 tablet by mouth Every Night., Disp: 90 tablet, Rfl: 3  •  omeprazole (priLOSEC) 20 MG capsule, Take 20 mg by mouth Daily As Needed., Disp: , Rfl:     Procedures    Lab Results (most recent)     None              Diagnoses and all orders for this visit:    1. Type 2 diabetes mellitus without complication, without long-term current use of insulin (CMS/[facility]) (Primary)    2. Elevated hematocrit    3. Ear pain, right    Type 2 diabetes improving significantly, reviewed recent labs with patient.  Continue same meds and lifestyle changes.  She has had a gradual improvement in her A1c from 8.5 down to 7.2.  She is also been able to maintain weight, exercising and eating a good amount of veggies.    Patient with elevated hematocrit, stable.  Now in normal limits.  I do think it be wise to continue baby aspirin 81 daily.  No change to meds.    Patient with right ear pain that has been chronic.  No evidence of infection on exam.  I think this is more allergic related.  Recommended Flonase, Allegra and Mucinex as needed.  Okay to use a topical hydrocortisone cream if itching.  Not needing antibiotics right now.  No cerumen impaction.    Prior to next appointment, hemoglobin A1c, vitamin B12, CMP, CBC, lipids, TSH.    Return in 4 months (on 4/21/2021), or if symptoms worsen or fail to improve, for Recheck diabetes with labs prior.      Caroline Gomes MD    Mask and protective eyewear worn by myself and medical assistant during entire  patient encounter.

## 2021-04-15 DIAGNOSIS — E11.9 TYPE 2 DIABETES MELLITUS WITHOUT COMPLICATION, WITHOUT LONG-TERM CURRENT USE OF INSULIN (HCC): Primary | ICD-10-CM

## 2021-04-15 DIAGNOSIS — E78.41 ELEVATED LIPOPROTEIN(A): ICD-10-CM

## 2021-04-15 DIAGNOSIS — E78.2 MIXED HYPERLIPIDEMIA: ICD-10-CM

## 2021-04-15 DIAGNOSIS — R71.8 ELEVATED HEMATOCRIT: ICD-10-CM

## 2021-04-15 DIAGNOSIS — R79.9 ABNORMAL BLOOD CELL COUNT: ICD-10-CM

## 2021-04-16 LAB
ALBUMIN SERPL-MCNC: 4.7 G/DL (ref 3.5–5.2)
ALBUMIN/GLOB SERPL: 2.1 G/DL
ALP SERPL-CCNC: 101 U/L (ref 39–117)
ALT SERPL-CCNC: 25 U/L (ref 1–33)
AST SERPL-CCNC: 16 U/L (ref 1–32)
BASOPHILS # BLD AUTO: 0.07 10*3/MM3 (ref 0–0.2)
BASOPHILS NFR BLD AUTO: 0.9 % (ref 0–1.5)
BILIRUB SERPL-MCNC: 0.9 MG/DL (ref 0–1.2)
BUN SERPL-MCNC: 17 MG/DL (ref 8–23)
BUN/CREAT SERPL: 23 (ref 7–25)
CALCIUM SERPL-MCNC: 9.9 MG/DL (ref 8.6–10.5)
CHLORIDE SERPL-SCNC: 102 MMOL/L (ref 98–107)
CHOLEST SERPL-MCNC: 182 MG/DL (ref 0–200)
CO2 SERPL-SCNC: 23.2 MMOL/L (ref 22–29)
CREAT SERPL-MCNC: 0.74 MG/DL (ref 0.57–1)
EOSINOPHIL # BLD AUTO: 0.19 10*3/MM3 (ref 0–0.4)
EOSINOPHIL NFR BLD AUTO: 2.4 % (ref 0.3–6.2)
ERYTHROCYTE [DISTWIDTH] IN BLOOD BY AUTOMATED COUNT: 12.9 % (ref 12.3–15.4)
FT4I SERPL CALC-MCNC: 2.4 (ref 1.2–4.9)
GLOBULIN SER CALC-MCNC: 2.2 GM/DL
GLUCOSE SERPL-MCNC: 156 MG/DL (ref 65–99)
HBA1C MFR BLD: 7.3 % (ref 4.8–5.6)
HCT VFR BLD AUTO: 48.7 % (ref 34–46.6)
HDLC SERPL-MCNC: 59 MG/DL (ref 40–60)
HGB BLD-MCNC: 17 G/DL (ref 12–15.9)
IMM GRANULOCYTES # BLD AUTO: 0.03 10*3/MM3 (ref 0–0.05)
IMM GRANULOCYTES NFR BLD AUTO: 0.4 % (ref 0–0.5)
LDLC SERPL CALC-MCNC: 91 MG/DL (ref 0–100)
LDLC/HDLC SERPL: 1.43 {RATIO}
LYMPHOCYTES # BLD AUTO: 1.71 10*3/MM3 (ref 0.7–3.1)
LYMPHOCYTES NFR BLD AUTO: 21.8 % (ref 19.6–45.3)
MCH RBC QN AUTO: 30.1 PG (ref 26.6–33)
MCHC RBC AUTO-ENTMCNC: 34.9 G/DL (ref 31.5–35.7)
MCV RBC AUTO: 86.2 FL (ref 79–97)
MONOCYTES # BLD AUTO: 0.54 10*3/MM3 (ref 0.1–0.9)
MONOCYTES NFR BLD AUTO: 6.9 % (ref 5–12)
NEUTROPHILS # BLD AUTO: 5.3 10*3/MM3 (ref 1.7–7)
NEUTROPHILS NFR BLD AUTO: 67.6 % (ref 42.7–76)
NRBC BLD AUTO-RTO: 0 /100 WBC (ref 0–0.2)
PLATELET # BLD AUTO: 306 10*3/MM3 (ref 140–450)
POTASSIUM SERPL-SCNC: 4.1 MMOL/L (ref 3.5–5.2)
PROT SERPL-MCNC: 6.9 G/DL (ref 6–8.5)
RBC # BLD AUTO: 5.65 10*6/MM3 (ref 3.77–5.28)
SODIUM SERPL-SCNC: 139 MMOL/L (ref 136–145)
T3RU NFR SERPL: 24 % (ref 24–39)
T4 SERPL-MCNC: 9.9 UG/DL (ref 4.5–12)
TRIGL SERPL-MCNC: 192 MG/DL (ref 0–150)
TSH SERPL DL<=0.005 MIU/L-ACNC: 1.21 UIU/ML (ref 0.45–4.5)
VLDLC SERPL CALC-MCNC: 32 MG/DL (ref 5–40)
WBC # BLD AUTO: 7.84 10*3/MM3 (ref 3.4–10.8)

## 2021-04-22 ENCOUNTER — OFFICE VISIT (OUTPATIENT)
Dept: FAMILY MEDICINE CLINIC | Facility: CLINIC | Age: 66
End: 2021-04-22

## 2021-04-22 ENCOUNTER — TELEPHONE (OUTPATIENT)
Dept: ONCOLOGY | Facility: CLINIC | Age: 66
End: 2021-04-22

## 2021-04-22 VITALS
OXYGEN SATURATION: 98 % | SYSTOLIC BLOOD PRESSURE: 126 MMHG | DIASTOLIC BLOOD PRESSURE: 74 MMHG | HEIGHT: 67 IN | BODY MASS INDEX: 27.5 KG/M2 | HEART RATE: 78 BPM | WEIGHT: 175.2 LBS | TEMPERATURE: 98.1 F

## 2021-04-22 DIAGNOSIS — E78.41 ELEVATED LIPOPROTEIN(A): ICD-10-CM

## 2021-04-22 DIAGNOSIS — J45.20 MILD INTERMITTENT REACTIVE AIRWAY DISEASE WITHOUT COMPLICATION: ICD-10-CM

## 2021-04-22 DIAGNOSIS — K21.9 GASTROESOPHAGEAL REFLUX DISEASE WITHOUT ESOPHAGITIS: ICD-10-CM

## 2021-04-22 DIAGNOSIS — E11.9 TYPE 2 DIABETES MELLITUS WITHOUT COMPLICATION, WITHOUT LONG-TERM CURRENT USE OF INSULIN (HCC): Primary | ICD-10-CM

## 2021-04-22 DIAGNOSIS — R71.8 ELEVATED HEMATOCRIT: ICD-10-CM

## 2021-04-22 PROCEDURE — 99214 OFFICE O/P EST MOD 30 MIN: CPT | Performed by: FAMILY MEDICINE

## 2021-04-22 RX ORDER — MELOXICAM 15 MG/1
15 TABLET ORAL DAILY
COMMUNITY
Start: 2021-04-09 | End: 2021-04-22

## 2021-04-22 RX ORDER — MONTELUKAST SODIUM 10 MG/1
10 TABLET ORAL NIGHTLY
Qty: 90 TABLET | Refills: 1 | Status: SHIPPED | OUTPATIENT
Start: 2021-04-22 | End: 2021-12-06

## 2021-04-22 RX ORDER — ATORVASTATIN CALCIUM 20 MG/1
20 TABLET, FILM COATED ORAL NIGHTLY
Qty: 90 TABLET | Refills: 1 | Status: SHIPPED | OUTPATIENT
Start: 2021-04-22 | End: 2021-08-09

## 2021-04-22 RX ORDER — EMPAGLIFLOZIN 25 MG/1
1 TABLET, FILM COATED ORAL DAILY
Qty: 90 TABLET | Refills: 1 | Status: SHIPPED | OUTPATIENT
Start: 2021-04-22 | End: 2021-07-22

## 2021-04-22 RX ORDER — METFORMIN HYDROCHLORIDE 500 MG/1
TABLET, EXTENDED RELEASE ORAL
Qty: 360 TABLET | Refills: 1 | Status: SHIPPED | OUTPATIENT
Start: 2021-04-22 | End: 2021-08-09

## 2021-04-22 NOTE — PROGRESS NOTES
" Chief Complaint  Diabetes Mellitus (4 month follow up with labs prior ) and Heartburn (prilosec not helping to much having reflux lately a lot )    Subjective          Lou Kimball presents to [facility]  History of Present Illness  Type 2 diabetes well controlled on Metformin and Jardiance.  Working to maintain healthy lifestyle. She does eat a keto diet.  Breakfast: 2 eggs or protein drink  Lunch: good sized salad or soup  Dinner: may or may not eat, some time she has an apple.  Or eat a protein in between.  Has cheese and ham if she is hungry.   Exercise: walks about an hour a day.     GERD that is not well controlled.  Worsening symptoms, on omeprazole 20 mg daily.Chronic problem, could not drink OJ as a kid.  She went to an ENT years ago for cough.  She has been doing omeprazole.    Elevated hematocrit, normal breathing, no early nighttime wakening, feels that she is well rested when waking up in the morning, no snoring.    Objective   Vital Signs:   /74   Pulse 78   Temp 98.1 °F (36.7 °C)   Ht 170.2 cm (67\")   Wt 79.5 kg (175 lb 3.2 oz)   SpO2 98%   BMI 27.44 kg/m²     Physical Exam  Vitals and nursing note reviewed.   Constitutional:       General: She is not in acute distress.     Appearance: She is well-developed.   HENT:      Head: Normocephalic.      Nose: Nose normal.   Eyes:      General: No scleral icterus.  Pulmonary:      Effort: Pulmonary effort is normal. No respiratory distress.   Musculoskeletal:         General: Normal range of motion.   Skin:     General: Skin is warm and dry.      Findings: No rash.   Neurological:      Mental Status: She is alert and oriented to person, place, and time.   Psychiatric:         Behavior: Behavior normal.         Thought Content: Thought content normal.         Judgment: Judgment normal.        Result Review :   The following data was reviewed by: Caroline Gomes MD on 04/22/2021:  CMP    CMP 9/28/20 4/15/21   Glucose " 150 (A) 156 (A)   BUN 12 17   Creatinine 0.80 0.74   eGFR Non  Am 72 78   eGFR African Am 87 95   Sodium 138 139   Potassium 3.8 4.1   Chloride 102 102   Calcium 9.6 9.9   Total Protein 6.8 6.9   Albumin 4.90 4.70   Globulin 1.9 2.2   Total Bilirubin 0.8 0.9   Alkaline Phosphatase 103 101   AST (SGOT) 14 16   ALT (SGPT) 20 25   (A) Abnormal value       Comments are available for some flowsheets but are not being displayed.           CBC w/diff    CBC w/Diff 9/28/20 12/16/20 4/15/21   WBC 7.21 6.76 7.84   RBC 5.64 (A) 5.46 (A) 5.65 (A)   Hemoglobin 16.2 (A) 15.8 17.0 (A)   Hematocrit 48.5 (A) 46.5 48.7 (A)   MCV 86.0 85.2 86.2   MCH 28.7 28.9 30.1   MCHC 33.4 34.0 34.9   RDW 13.5 13.5 12.9   Platelets 330 329 306   Neutrophil Rel % 65.7 65.8 67.6   Lymphocyte Rel % 22.5 23.1 21.8   Monocyte Rel % 8.5 7.5 6.9   Eosinophil Rel % 1.9 2.2 2.4   Basophil Rel % 1.1 1.0 0.9   (A) Abnormal value            Lipid Panel    Lipid Panel 9/28/20 4/15/21   Total Cholesterol 170 182   Triglycerides 167 (A) 192 (A)   HDL Cholesterol 53 59   VLDL Cholesterol 33.4 32   LDL Cholesterol  84 91   LDL/HDL Ratio  1.43   (A) Abnormal value       Comments are available for some flowsheets but are not being displayed.           TSH    TSH 9/28/20 4/15/21   TSH 1.390 1.210           Most Recent A1C    HGBA1C Most Recent 4/15/21   Hemoglobin A1C 7.30 (A)   (A) Abnormal value       Comments are available for some flowsheets but are not being displayed.                     Assessment and Plan    Diagnoses and all orders for this visit:    1. Type 2 diabetes mellitus without complication, without long-term current use of insulin (CMS/[facility]) (Primary)  -     Empagliflozin (Jardiance) 25 MG tablet; Take 25 mg by mouth Daily.  Dispense: 90 tablet; Refill: 1  -     metFORMIN ER (GLUCOPHAGE-XR) 500 MG 24 hr tablet; 1000 mg in the AM and PM  Dispense: 360 tablet; Refill: 1    2. Gastroesophageal reflux disease without esophagitis    3. Elevated  hematocrit  -     CBC & Differential  -     Iron and TIBC  -     Ferritin  -     Ambulatory Referral to Hematology    4. Mild intermittent reactive airway disease without complication  -     montelukast (SINGULAIR) 10 MG tablet; Take 1 tablet by mouth Every Night.  Dispense: 90 tablet; Refill: 1    5. Elevated lipoprotein(a)  -     atorvastatin (LIPITOR) 20 MG tablet; Take 1 tablet by mouth Every Night.  Dispense: 90 tablet; Refill: 1    Type 2 diabetes stable, adherent with Metformin and Jardiance without adverse effect.  Continue with diet and exercise.  Reviewed recent lab work with patient.  Stable.    GERD: Not well controlled, she does omeprazole 20 mg 14 days on 14 days off.  Recommended that she change this to every other day with Pepcid 20 mg in between.    Patient with elevated hematocrit, negative screening for sleep apnea.  I do think she would benefit from phlebotomy but she feels that she is a hard stick and does not want to do it if unnecessary.  We will repeat CBC levels above with ferritin levels.  She is taking aspirin every other day as she bruises very easily.  Will refer to hematology for further evaluation      Follow Up   Return in 3 months (on 7/22/2021), or if symptoms worsen or fail to improve, for Recheck Diabetes.     Patient was given instructions and counseling regarding her condition or for health maintenance advice. Please see specific information pulled into the AVS if appropriate. Medical assistant and I wore mask and eyewear protection during entire encounter.  Patient  wore mask.    Caroline Gomes MD

## 2021-04-28 ENCOUNTER — OFFICE VISIT (OUTPATIENT)
Dept: ONCOLOGY | Facility: CLINIC | Age: 66
End: 2021-04-28

## 2021-04-28 ENCOUNTER — LAB (OUTPATIENT)
Dept: LAB | Facility: HOSPITAL | Age: 66
End: 2021-04-28

## 2021-04-28 VITALS
OXYGEN SATURATION: 98 % | HEIGHT: 67 IN | WEIGHT: 174 LBS | TEMPERATURE: 97.1 F | HEART RATE: 86 BPM | DIASTOLIC BLOOD PRESSURE: 79 MMHG | BODY MASS INDEX: 27.31 KG/M2 | SYSTOLIC BLOOD PRESSURE: 130 MMHG

## 2021-04-28 DIAGNOSIS — R71.8 ELEVATED HEMATOCRIT: Primary | ICD-10-CM

## 2021-04-28 DIAGNOSIS — D75.1 SECONDARY POLYCYTHEMIA: ICD-10-CM

## 2021-04-28 LAB
BASOPHILS # BLD AUTO: 0.07 10*3/MM3 (ref 0–0.2)
BASOPHILS NFR BLD AUTO: 0.7 % (ref 0–1.5)
DEPRECATED RDW RBC AUTO: 40.5 FL (ref 37–54)
EOSINOPHIL # BLD AUTO: 0.24 10*3/MM3 (ref 0–0.4)
EOSINOPHIL NFR BLD AUTO: 2.6 % (ref 0.3–6.2)
ERYTHROCYTE [DISTWIDTH] IN BLOOD BY AUTOMATED COUNT: 13.2 % (ref 12.3–15.4)
FERRITIN SERPL-MCNC: 68 NG/ML (ref 13–150)
HCT VFR BLD AUTO: 49.7 % (ref 34–46.6)
HGB BLD-MCNC: 16.7 G/DL (ref 12–15.9)
IMM GRANULOCYTES # BLD AUTO: 0.05 10*3/MM3 (ref 0–0.05)
IMM GRANULOCYTES NFR BLD AUTO: 0.5 % (ref 0–0.5)
IRON 24H UR-MRATE: 77 MCG/DL (ref 37–145)
IRON SATN MFR SERPL: 23 % (ref 20–50)
LYMPHOCYTES # BLD AUTO: 2.6 10*3/MM3 (ref 0.7–3.1)
LYMPHOCYTES NFR BLD AUTO: 27.7 % (ref 19.6–45.3)
MCH RBC QN AUTO: 28.9 PG (ref 26.6–33)
MCHC RBC AUTO-ENTMCNC: 33.6 G/DL (ref 31.5–35.7)
MCV RBC AUTO: 86 FL (ref 79–97)
MONOCYTES # BLD AUTO: 0.61 10*3/MM3 (ref 0.1–0.9)
MONOCYTES NFR BLD AUTO: 6.5 % (ref 5–12)
NEUTROPHILS NFR BLD AUTO: 5.8 10*3/MM3 (ref 1.7–7)
NEUTROPHILS NFR BLD AUTO: 62 % (ref 42.7–76)
NRBC BLD AUTO-RTO: 0 /100 WBC (ref 0–0.2)
PLATELET # BLD AUTO: 341 10*3/MM3 (ref 140–450)
PMV BLD AUTO: 10.7 FL (ref 6–12)
RBC # BLD AUTO: 5.78 10*6/MM3 (ref 3.77–5.28)
TIBC SERPL-MCNC: 341 MCG/DL (ref 298–536)
UIBC SERPL-MCNC: 264 MCG/DL (ref 112–346)
WBC # BLD AUTO: 9.37 10*3/MM3 (ref 3.4–10.8)

## 2021-04-28 PROCEDURE — 82668 ASSAY OF ERYTHROPOIETIN: CPT

## 2021-04-28 PROCEDURE — 83540 ASSAY OF IRON: CPT | Performed by: FAMILY MEDICINE

## 2021-04-28 PROCEDURE — 36415 COLL VENOUS BLD VENIPUNCTURE: CPT

## 2021-04-28 PROCEDURE — 82728 ASSAY OF FERRITIN: CPT | Performed by: FAMILY MEDICINE

## 2021-04-28 PROCEDURE — 99205 OFFICE O/P NEW HI 60 MIN: CPT | Performed by: NURSE PRACTITIONER

## 2021-04-28 PROCEDURE — 82607 VITAMIN B-12: CPT | Performed by: FAMILY MEDICINE

## 2021-04-28 PROCEDURE — 83550 IRON BINDING TEST: CPT | Performed by: FAMILY MEDICINE

## 2021-04-28 PROCEDURE — 85025 COMPLETE CBC W/AUTO DIFF WBC: CPT

## 2021-04-28 RX ORDER — FAMOTIDINE 20 MG/1
20 TABLET, FILM COATED ORAL EVERY OTHER DAY
COMMUNITY
End: 2022-04-25

## 2021-04-28 NOTE — PROGRESS NOTES
"Chief Complaint: Elevated Hematocrit    REFERRING PROVIDER: Caroline Gomes MD    Subjective      History of Present Illness     Ms. Kimball is a 65 y.o. female referred to us by Dr. Gomes for noted elevated hematocrit. Records reviewed show last CBC available in September 2020 with hemoglobin 16.2, hematocrit 48.5. Today her levels are very similar with hemoglobin of 16.7, hematocrit 49.7. Recent CMP results reviewed completely within normal limits with exception of slightly elevated glucose.    Patient is a never smoker. She was exposed to secondhand smoke for the 1st 20 years of her life but none in adulthood. Low risk of sleep apnea with no reports of snoring or trouble breathing in the night. She denies any history of thrombosis. Denies any pruritus. No unusual weight loss, fever or night sweats. No bleeding difficulty. Patient states she feels she is fairly healthy. She tries to exercise and remain active.    Patient moved to this area roughly 3 years ago, previously living in [place]. We do not have any access to old records to compare prior CBC data.    Only medication change of note is that the patient previously took Actos in roughly 2 years ago with switch to Jardiance. Patient wonders if this might be the cause of her now elevated hematocrit. Review of the literature shows it is possible in 3 to 4% of patients.    Patient denies other new concerns today.    Objective   Vital Signs:   /79   Pulse 86   Temp 97.1 °F (36.2 °C) (Infrared)   Ht 170.2 cm (67\") Comment: perpt  Wt 78.9 kg (174 lb) Comment: per pt who refused to be weighed  SpO2 98%   BMI 27.25 kg/m²     Physical Exam  Vitals reviewed.   Constitutional:       Appearance: She is well-developed.   HENT:      Head: Normocephalic.      Right Ear: External ear normal.      Left Ear: External ear normal.      Nose: Nose normal.   Eyes:      Conjunctiva/sclera: Conjunctivae normal.      Pupils: Pupils are equal, round, and reactive to light. "   Cardiovascular:      Rate and Rhythm: Normal rate and regular rhythm.      Heart sounds: Normal heart sounds.   Pulmonary:      Effort: Pulmonary effort is normal.   Abdominal:      General: Bowel sounds are normal. There is no distension.      Palpations: Abdomen is soft. There is no hepatomegaly or splenomegaly.      Tenderness: There is no abdominal tenderness.   Musculoskeletal:         General: Normal range of motion.      Cervical back: Normal range of motion and neck supple.   Lymphadenopathy:      Cervical: No cervical adenopathy.      Upper Body:      Right upper body: No supraclavicular adenopathy.      Left upper body: No supraclavicular adenopathy.   Skin:     General: Skin is warm and dry.   Neurological:      Mental Status: She is alert and oriented to person, place, and time.          Result Review :  Results from last 7 days   Lab Units 04/28/21  1302   WBC 10*3/mm3 9.37   NEUTROS ABS 10*3/mm3 5.80   HEMOGLOBIN g/dL 16.7*   HEMATOCRIT % 49.7*   PLATELETS 10*3/mm3 341             Lab Results   Component Value Date    BUN 17 04/15/2021    CREATININE 0.74 04/15/2021    EGFRIFNONA 78 04/15/2021    EGFRIFAFRI 95 04/15/2021    BCR 23.0 04/15/2021    K 4.1 04/15/2021    CO2 23.2 04/15/2021    CALCIUM 9.9 04/15/2021    PROTENTOTREF 6.9 04/15/2021    ALBUMIN 4.70 04/15/2021    LABIL2 2.1 04/15/2021    AST 16 04/15/2021    ALT 25 04/15/2021          Assessment: This is a 65-year-old female with persistent elevation of hemoglobin/hematocrit at least dating back over 6 months. No access to earlier records. Patient has no obvious risk factors for secondary polycythemia. She is on Jardiance, initiated 2 years ago but the only new medication compared to other she takes. This does have listed 3 to 4% possible increase in hematocrit and may very well be the culprit. However we will work her up for possible primary polycythemia. Patient reports that she will be gone for the entire month of May and we will therefore  plan to see her back in early June for review of lab work.    PLAN:  1. Patient to return to lab today for serum erythropoietin level, JAK2 V617F, and BCR able per FISH. She already has other labs ordered per Dr. Gomes including ferritin, iron profile, and B12 level which we will complete as well.  2. Patient will return in roughly 5 weeks for follow-up with Dr. Hubbard and review of lab work.   3. Of note, patient is not opposed to phlebotomy however with relative stability of her numbers over at least the last 6 months we will hold off on this. Patient states that she has very poor veins and this is something to be considered in the event that she does need phlebotomized.      I spent 50 minutes caring for Lou on this date of service. This time includes time spent by me in the following activities:preparing for the visit, reviewing tests, obtaining and/or reviewing a separately obtained history, performing a medically appropriate examination and/or evaluation , counseling and educating the patient/family/caregiver, ordering medications, tests, or procedures, referring and communicating with other health care professionals , documenting information in the medical record, independently interpreting results and communicating that information with the patient/family/caregiver and care coordination      Lou Ferraro, APRN  04/28/21

## 2021-04-29 LAB — EPO SERPL-ACNC: 8.7 MIU/ML (ref 2.6–18.5)

## 2021-05-03 LAB — REF LAB TEST METHOD: NORMAL

## 2021-05-04 LAB — REF LAB TEST METHOD: NORMAL

## 2021-05-14 DIAGNOSIS — E11.9 TYPE 2 DIABETES MELLITUS WITHOUT COMPLICATION, WITHOUT LONG-TERM CURRENT USE OF INSULIN (HCC): Primary | ICD-10-CM

## 2021-06-01 ENCOUNTER — OFFICE VISIT (OUTPATIENT)
Dept: ONCOLOGY | Facility: CLINIC | Age: 66
End: 2021-06-01

## 2021-06-01 ENCOUNTER — LAB (OUTPATIENT)
Dept: LAB | Facility: HOSPITAL | Age: 66
End: 2021-06-01

## 2021-06-01 VITALS
BODY MASS INDEX: 27.65 KG/M2 | HEIGHT: 67 IN | SYSTOLIC BLOOD PRESSURE: 119 MMHG | OXYGEN SATURATION: 98 % | TEMPERATURE: 98 F | HEART RATE: 73 BPM | RESPIRATION RATE: 14 BRPM | DIASTOLIC BLOOD PRESSURE: 77 MMHG | WEIGHT: 176.2 LBS

## 2021-06-01 DIAGNOSIS — R71.8 ELEVATED HEMATOCRIT: Primary | ICD-10-CM

## 2021-06-01 DIAGNOSIS — D75.1 SECONDARY POLYCYTHEMIA: ICD-10-CM

## 2021-06-01 LAB
BASOPHILS # BLD AUTO: 0.09 10*3/MM3 (ref 0–0.2)
BASOPHILS NFR BLD AUTO: 1.2 % (ref 0–1.5)
DEPRECATED RDW RBC AUTO: 41.3 FL (ref 37–54)
EOSINOPHIL # BLD AUTO: 0.23 10*3/MM3 (ref 0–0.4)
EOSINOPHIL NFR BLD AUTO: 3.1 % (ref 0.3–6.2)
ERYTHROCYTE [DISTWIDTH] IN BLOOD BY AUTOMATED COUNT: 13.2 % (ref 12.3–15.4)
HCT VFR BLD AUTO: 49.2 % (ref 34–46.6)
HGB BLD-MCNC: 16.3 G/DL (ref 12–15.9)
IMM GRANULOCYTES # BLD AUTO: 0.04 10*3/MM3 (ref 0–0.05)
IMM GRANULOCYTES NFR BLD AUTO: 0.5 % (ref 0–0.5)
LYMPHOCYTES # BLD AUTO: 2.04 10*3/MM3 (ref 0.7–3.1)
LYMPHOCYTES NFR BLD AUTO: 27.2 % (ref 19.6–45.3)
MCH RBC QN AUTO: 28.6 PG (ref 26.6–33)
MCHC RBC AUTO-ENTMCNC: 33.1 G/DL (ref 31.5–35.7)
MCV RBC AUTO: 86.5 FL (ref 79–97)
MONOCYTES # BLD AUTO: 0.59 10*3/MM3 (ref 0.1–0.9)
MONOCYTES NFR BLD AUTO: 7.9 % (ref 5–12)
NEUTROPHILS NFR BLD AUTO: 4.5 10*3/MM3 (ref 1.7–7)
NEUTROPHILS NFR BLD AUTO: 60.1 % (ref 42.7–76)
NRBC BLD AUTO-RTO: 0 /100 WBC (ref 0–0.2)
PLATELET # BLD AUTO: 315 10*3/MM3 (ref 140–450)
PMV BLD AUTO: 10.7 FL (ref 6–12)
RBC # BLD AUTO: 5.69 10*6/MM3 (ref 3.77–5.28)
WBC # BLD AUTO: 7.49 10*3/MM3 (ref 3.4–10.8)

## 2021-06-01 PROCEDURE — 85025 COMPLETE CBC W/AUTO DIFF WBC: CPT

## 2021-06-01 PROCEDURE — 99213 OFFICE O/P EST LOW 20 MIN: CPT | Performed by: INTERNAL MEDICINE

## 2021-06-01 RX ORDER — NEOMYCIN SULFATE, POLYMYXIN B SULFATE, AND DEXAMETHASONE 3.5; 10000; 1 MG/G; [USP'U]/G; MG/G
OINTMENT OPHTHALMIC
COMMUNITY
Start: 2021-05-01 | End: 2021-07-22

## 2021-06-01 NOTE — PROGRESS NOTES
"Chief Complaint: Elevated Hematocrit    REFERRING PROVIDER: Caroline Gomes MD    Subjective      History of Present Illness     Ms. Kimball is a 65 y.o. female referred to us by Dr. Gomes for noted elevated hematocrit. Records reviewed show last CBC available in September 2020 with hemoglobin 16.2, hematocrit 48.5. Today her levels are very similar with hemoglobin of 16.7, hematocrit 49.7. Recent CMP results reviewed completely within normal limits with exception of slightly elevated glucose.    Patient is a never smoker. She was exposed to secondhand smoke for the 1st 20 years of her life but none in adulthood. Low risk of sleep apnea with no reports of snoring or trouble breathing in the night. She denies any history of thrombosis. Denies any pruritus. No unusual weight loss, fever or night sweats. No bleeding difficulty. Patient states she feels she is fairly healthy. She tries to exercise and remain active.    Patient moved to this area roughly 3 years ago, previously living in [place]. We do not have any access to old records to compare prior CBC data.    Only medication change of note is that the patient previously took Actos in roughly 2 years ago with switch to Jardiance. Patient wonders if this might be the cause of her now elevated hematocrit. Review of the literature shows it is possible in 3 to 4% of patients.    Patient denies other new concerns today.    When seen on 4/28/2021 an erythropoietin level was checked which was not substantially suppressed at 8.7.  A JAK2 V6 17F mutation was performed and negative.  A BCR-ABL FISH was performed and negative.    Objective   Vital Signs:   /77   Pulse 73   Temp 98 °F (36.7 °C) (Infrared)   Resp 14   Ht 170.2 cm (67.01\") Comment: pt stated  Wt 79.9 kg (176 lb 3.2 oz) Comment: pt stated  SpO2 98%   BMI 27.59 kg/m²     CONSTITUTIONAL: pleasant well-developed adult woman  HEENT: no icterus, no thrush, moist membranes  NECK: no jvd  LYMPH: no cervical " or supraclavicular lad  CV: RRR, S1S2, no murmur  RESP: cta bilat, no wheezing, no rales  GI: soft, non-tender, no splenomegaly, +bs  MUSC: no edema, normal gait  NEURO: alert and oriented x3, normal strength  PSYCH: normal mood and affect    Result Review :  Results from last 7 days   Lab Units 06/01/21  0842   WBC 10*3/mm3 7.49   NEUTROS ABS 10*3/mm3 4.50   HEMOGLOBIN g/dL 16.3*   HEMATOCRIT % 49.2*   PLATELETS 10*3/mm3 315             Lab Results   Component Value Date    BUN 17 04/15/2021    CREATININE 0.74 04/15/2021    EGFRIFNONA 78 04/15/2021    EGFRIFAFRI 95 04/15/2021    BCR 23.0 04/15/2021    K 4.1 04/15/2021    CO2 23.2 04/15/2021    CALCIUM 9.9 04/15/2021    PROTENTOTREF 6.9 04/15/2021    ALBUMIN 4.70 04/15/2021    LABIL2 2.1 04/15/2021    AST 16 04/15/2021    ALT 25 04/15/2021          Assessment:  1.  Mild polycythemia:  The patient has had mild polycythemia present since at least September 2020.  Evaluation shows a nonsuppressed erythropoietin level, negative JAK2 V6 17F, negative BCR-ABL FISH, no leukocytosis or thrombocytosis.  This would be most consistent with a secondary polycythemia as opposed to PV.  The patient has no cardiopulmonary risk factors, normal O2 sats on room air, and no symptoms/signs suggestive of sleep apnea.  I think her polycythemia is most likely a side effect of Jardiance.  I think okay to continue this medication if needed for her diabetes as long as her CBC is monitored and hematocrit is less than 52%.  If her hematocrit goes over 52% I would recommend trial of a different medication.  If the patient discontinues Jardiance but remains polycythemic I would like to see her back for further evaluation.  She plans to have her CBC monitored by her PCP.    Guzman Hubbard MD  06/01/21

## 2021-06-16 RX ORDER — ESTRADIOL 0.1 MG/G
1 CREAM VAGINAL 2 TIMES WEEKLY
Qty: 45 G | Refills: 0 | Status: SHIPPED | OUTPATIENT
Start: 2021-06-17 | End: 2021-09-29 | Stop reason: SDUPTHER

## 2021-06-16 RX ORDER — NYSTATIN AND TRIAMCINOLONE ACETONIDE 100000; 1 [USP'U]/G; MG/G
OINTMENT TOPICAL 2 TIMES DAILY
Qty: 60 G | Refills: 0 | Status: SHIPPED | OUTPATIENT
Start: 2021-06-16 | End: 2021-11-24 | Stop reason: SDUPTHER

## 2021-07-22 ENCOUNTER — OFFICE VISIT (OUTPATIENT)
Dept: FAMILY MEDICINE CLINIC | Facility: CLINIC | Age: 66
End: 2021-07-22

## 2021-07-22 ENCOUNTER — TELEPHONE (OUTPATIENT)
Dept: GASTROENTEROLOGY | Facility: CLINIC | Age: 66
End: 2021-07-22

## 2021-07-22 VITALS
SYSTOLIC BLOOD PRESSURE: 112 MMHG | OXYGEN SATURATION: 98 % | HEART RATE: 78 BPM | WEIGHT: 177 LBS | TEMPERATURE: 98.8 F | DIASTOLIC BLOOD PRESSURE: 70 MMHG | BODY MASS INDEX: 27.78 KG/M2 | HEIGHT: 67 IN

## 2021-07-22 DIAGNOSIS — Z12.11 SCREEN FOR COLON CANCER: ICD-10-CM

## 2021-07-22 DIAGNOSIS — E11.9 TYPE 2 DIABETES MELLITUS WITHOUT COMPLICATION, WITHOUT LONG-TERM CURRENT USE OF INSULIN (HCC): Primary | ICD-10-CM

## 2021-07-22 LAB — HBA1C MFR BLD: 7.7 %

## 2021-07-22 PROCEDURE — 99214 OFFICE O/P EST MOD 30 MIN: CPT | Performed by: FAMILY MEDICINE

## 2021-07-22 PROCEDURE — 83036 HEMOGLOBIN GLYCOSYLATED A1C: CPT | Performed by: FAMILY MEDICINE

## 2021-07-22 NOTE — PROGRESS NOTES
"Chief Complaint  Diabetes Mellitus (3 month follow up no complains )    Subjective          Lou Kimball presents to [facility]  History of Present Illness  Pleasant 66-year-old female here for type 2 diabetes follow-up, not well controlled.  Slight worsening in hemoglobin A1c from 7.3-7.7.  She is adherent with Metformin 1000 twice daily and Jardiance 25 mg daily.    Objective   Vital Signs:   /70   Pulse 78   Temp 98.8 °F (37.1 °C)   Ht 170.2 cm (67\")   Wt 80.3 kg (177 lb)   SpO2 98%   BMI 27.72 kg/m²     Physical Exam  Vitals and nursing note reviewed.   Constitutional:       General: She is not in acute distress.     Appearance: She is well-developed.   HENT:      Head: Normocephalic.      Nose: Nose normal.   Cardiovascular:      Rate and Rhythm: Normal rate and regular rhythm.      Heart sounds: Normal heart sounds. No murmur heard.     Pulmonary:      Effort: Pulmonary effort is normal. No respiratory distress.      Breath sounds: Normal breath sounds.   Musculoskeletal:         General: Normal range of motion.   Skin:     General: Skin is warm and dry.      Findings: No rash.   Neurological:      Mental Status: She is alert and oriented to person, place, and time.   Psychiatric:         Behavior: Behavior normal.         Thought Content: Thought content normal.         Judgment: Judgment normal.        Result Review :   The following data was reviewed by: Caroline Gomes MD on 07/22/2021:  A1C Last 3 Results    HGBA1C Last 3 Results 12/16/20 4/15/21 7/22/21   Hemoglobin A1C 7.26 (A) 7.30 (A) 7.7   (A) Abnormal value       Comments are available for some flowsheets but are not being displayed.                     Assessment and Plan    Diagnoses and all orders for this visit:    1. Type 2 diabetes mellitus without complication, without long-term current use of insulin (CMS/[facility]) (Primary)  -     POC Glycosylated Hemoglobin (Hb A1C)  -     Comprehensive Metabolic " Panel; Future  -     CBC & Differential; Future  -     Lipid Panel; Future  -     Hemoglobin A1c; Future  -     Vitamin B12; Future  -     Folate; Future  -     Microalbumin / Creatinine Urine Ratio - Urine, Clean Catch; Future  -     SITagliptin (JANUVIA) 100 MG tablet; Take 1 tablet by mouth Daily.  Dispense: 90 tablet; Refill: 1  -     empagliflozin (JARDIANCE) 10 MG tablet tablet; Take 1 tablet by mouth Daily.  Dispense: 90 tablet; Refill: 1    2. Screen for colon cancer  -     Ambulatory Referral For Screening Colonoscopy    Type diabetes not well controlled, hemoglobin A1c above goal at 7.7 today.  Adherent with Metformin 1000 twice daily and Jardiance 25.      Follow Up   Return in about 3 months (around 10/22/2021), or if symptoms worsen or fail to improve, for Recheck diabetes with labs prior .  Patient was given instructions and counseling regarding her condition or for health maintenance advice. Please see specific information pulled into the AVS if appropriate. Medical assistant and I wore mask and eyewear protection during entire encounter.  Patient wore mask.    Caroline Gomes MD

## 2021-08-07 DIAGNOSIS — E78.41 ELEVATED LIPOPROTEIN(A): ICD-10-CM

## 2021-08-07 DIAGNOSIS — E11.9 TYPE 2 DIABETES MELLITUS WITHOUT COMPLICATION, WITHOUT LONG-TERM CURRENT USE OF INSULIN (HCC): ICD-10-CM

## 2021-08-09 RX ORDER — METFORMIN HYDROCHLORIDE 500 MG/1
TABLET, EXTENDED RELEASE ORAL
Qty: 360 TABLET | Refills: 1 | Status: SHIPPED | OUTPATIENT
Start: 2021-08-09 | End: 2021-12-20 | Stop reason: SDUPTHER

## 2021-08-09 RX ORDER — ATORVASTATIN CALCIUM 20 MG/1
TABLET, FILM COATED ORAL
Qty: 90 TABLET | Refills: 1 | Status: SHIPPED | OUTPATIENT
Start: 2021-08-09 | End: 2021-11-16 | Stop reason: SDUPTHER

## 2021-08-25 ENCOUNTER — TELEPHONE (OUTPATIENT)
Dept: GASTROENTEROLOGY | Facility: CLINIC | Age: 66
End: 2021-08-25

## 2021-08-25 NOTE — TELEPHONE ENCOUNTER
Last scope 09/11/2012-- no personal hx of polyps-- no family hx of polyps or colon ca-- ASA-- medications:    albuterol sulfate  (90 Base) MCG/ACT inhaler  aspirin 81 MG chewable tablet  atorvastatin (LIPITOR) 20 MG tablet  Cholecalciferol (VITAMIN D3) 5000 units capsule capsule  empagliflozin (JARDIANCE) 10 MG tablet tablet  estradiol (ESTRACE) 0.1 MG/GM vaginal cream  famotidine (PEPCID) 20 MG tablet  metFORMIN ER (GLUCOPHAGE-XR) 500 MG 24 hr tablet  montelukast (SINGULAIR) 10 MG tablet  nystatin-triamcinolone (MYCOLOG) 825935-8.1 UNIT/GM-% ointment  omeprazole (priLOSEC) 20 MG capsule  SITagliptin (JANUVIA) 100 MG tablet    OA form and last scope scanned into media

## 2021-08-31 NOTE — TELEPHONE ENCOUNTER
Called and spoke with patient.  She thought she wasn't due until 2022 but was told by her primary that she needed to have this done.  She is ok with waiting for 10 year guillermo.  Placed patient on our recall system for a month earlier then needed due to her annual stay out of Town that starting in October.

## 2021-09-28 ENCOUNTER — TRANSCRIBE ORDERS (OUTPATIENT)
Dept: FAMILY MEDICINE CLINIC | Facility: CLINIC | Age: 66
End: 2021-09-28

## 2021-09-28 DIAGNOSIS — Z12.31 SCREENING MAMMOGRAM FOR HIGH-RISK PATIENT: Primary | ICD-10-CM

## 2021-09-30 RX ORDER — ESTRADIOL 0.1 MG/G
1 CREAM VAGINAL 2 TIMES WEEKLY
Qty: 45 G | Refills: 0 | Status: SHIPPED | OUTPATIENT
Start: 2021-09-30 | End: 2021-11-24 | Stop reason: SDUPTHER

## 2021-11-16 ENCOUNTER — HOSPITAL ENCOUNTER (OUTPATIENT)
Dept: MAMMOGRAPHY | Facility: HOSPITAL | Age: 66
Discharge: HOME | End: 2021-11-16
Admitting: FAMILY MEDICINE

## 2021-11-16 ENCOUNTER — OFFICE VISIT (OUTPATIENT)
Dept: FAMILY MEDICINE CLINIC | Facility: CLINIC | Age: 66
End: 2021-11-16

## 2021-11-16 VITALS
HEART RATE: 85 BPM | OXYGEN SATURATION: 98 % | WEIGHT: 174 LBS | DIASTOLIC BLOOD PRESSURE: 74 MMHG | TEMPERATURE: 97.8 F | SYSTOLIC BLOOD PRESSURE: 116 MMHG | HEIGHT: 67 IN | BODY MASS INDEX: 27.31 KG/M2

## 2021-11-16 DIAGNOSIS — E78.41 ELEVATED LIPOPROTEIN(A): ICD-10-CM

## 2021-11-16 DIAGNOSIS — E11.9 TYPE 2 DIABETES MELLITUS WITHOUT COMPLICATION, WITHOUT LONG-TERM CURRENT USE OF INSULIN (HCC): Primary | ICD-10-CM

## 2021-11-16 DIAGNOSIS — Z12.31 SCREENING MAMMOGRAM FOR HIGH-RISK PATIENT: ICD-10-CM

## 2021-11-16 LAB
FERRITIN SERPL-MCNC: 49 NG/ML (ref 15–150)
IRON SATN MFR SERPL: 20 % (ref 15–55)
IRON SERPL-MCNC: 71 UG/DL (ref 27–139)
TIBC SERPL-MCNC: 350 UG/DL (ref 250–450)
UIBC SERPL-MCNC: 279 UG/DL (ref 118–369)

## 2021-11-16 PROCEDURE — 77067 SCR MAMMO BI INCL CAD: CPT

## 2021-11-16 PROCEDURE — 77063 BREAST TOMOSYNTHESIS BI: CPT

## 2021-11-16 PROCEDURE — 99214 OFFICE O/P EST MOD 30 MIN: CPT | Performed by: FAMILY MEDICINE

## 2021-11-16 RX ORDER — ATORVASTATIN CALCIUM 20 MG/1
20 TABLET, FILM COATED ORAL
Qty: 90 TABLET | Refills: 1 | Status: SHIPPED | OUTPATIENT
Start: 2021-11-16 | End: 2021-12-20 | Stop reason: SDUPTHER

## 2021-11-16 NOTE — PROGRESS NOTES
"Chief Complaint  Diabetes Mellitus (follow up no complains doing well )    Subjective          Lou Kimball presents to [facility]  History of Present Illness    Pleasant 66-year-old female here for type 2 diabetes, improving, meeting goal of hemoglobin A1c less than 7.5.  She has done better with a lower dose of Jardiance, fewer anal fissures.  However feeling that she would like to come off this medication completely.  She does have an appoint with endocrinology next week.  Complicated history with brother and sister and father with pancreatitis.  Therefore she is nervous to take injectable GLP-1's.    Hyperlipidemia well-controlled on current meds.  Reviewed labs with patient.    Objective   Vital Signs:   /74   Pulse 85   Temp 97.8 °F (36.6 °C)   Ht 170.2 cm (67\")   Wt 78.9 kg (174 lb)   SpO2 98%   BMI 27.25 kg/m²     Physical Exam  Vitals and nursing note reviewed.   Constitutional:       General: She is not in acute distress.     Appearance: She is well-developed.   HENT:      Head: Normocephalic.      Nose: Nose normal.   Cardiovascular:      Rate and Rhythm: Normal rate and regular rhythm.      Heart sounds: Normal heart sounds. No murmur heard.      Pulmonary:      Effort: Pulmonary effort is normal. No respiratory distress.      Breath sounds: Normal breath sounds.   Musculoskeletal:         General: Normal range of motion.   Skin:     General: Skin is warm and dry.      Findings: No rash.   Neurological:      Mental Status: She is alert and oriented to person, place, and time.   Psychiatric:         Behavior: Behavior normal.         Thought Content: Thought content normal.         Judgment: Judgment normal.        Result Review :                 Assessment and Plan    Diagnoses and all orders for this visit:    1. Type 2 diabetes mellitus without complication, without long-term current use of insulin ([facility]) (Primary)  Assessment & Plan:  Type 2 diabetes well " controlled, within goal of less than 7.5.  She is working to stay active, adherent with.  She is on atorvastatin, no ARB or ACE inhibitor -but normal microalbumin, no hypertension.    Adherent with Metformin 1000 twice daily, Januvia 100 mg daily and Jardiance 10 mg daily.  Some vaginal dryness/anal fissures when starting Jardiance.  The symptoms have improved when going down from 25 to 10 mg.    She has an appointment coming up to see an endocrinologist.  We have discussed considering a weekly GLP-1 but she is very nervous about this because she has a family history of brother, sister and father with severe pancreatitis.  Her brother was hospitalized for 3 months for this.  She does not want to increase her risk of pancreatitis.      I would recommend we continue on the same regimen for now, possibly consider a GLP-1 but nervous about weight gain.      2. Elevated lipoprotein(a)  Assessment & Plan:  Hyperlipidemia well controlled on atorvastatin 20 mg daily.  Labs reviewed viewed with patient, normal, no changes to medication.    Orders:  -     atorvastatin (LIPITOR) 20 MG tablet; Take 1 tablet by mouth every night at bedtime.  Dispense: 90 tablet; Refill: 1      Follow Up   Return in about 6 months (around 5/16/2022), or if symptoms worsen or fail to improve, for Annual Exam.  Patient was given instructions and counseling regarding her condition or for health maintenance advice. Please see specific information pulled into the AVS if appropriate. Medical assistant and I wore mask and eyewear protection during entire encounter.  Patient wore mask.    Caroline Gomes MD

## 2021-11-16 NOTE — ASSESSMENT & PLAN NOTE
Type 2 diabetes well controlled, within goal of less than 7.5.  She is working to stay active, adherent with.  She is on atorvastatin, no ARB or ACE inhibitor -but normal microalbumin, no hypertension.    Adherent with Metformin 1000 twice daily, Januvia 100 mg daily and Jardiance 10 mg daily.  Some vaginal dryness/anal fissures when starting Jardiance.  The symptoms have improved when going down from 25 to 10 mg.    She has an appointment coming up to see an endocrinologist.  We have discussed considering a weekly GLP-1 but she is very nervous about this because she has a family history of brother, sister and father with severe pancreatitis.  Her brother was hospitalized for 3 months for this.  She does not want to increase her risk of pancreatitis.      I would recommend we continue on the same regimen for now, possibly consider a GLP-1 but nervous about weight gain.

## 2021-11-16 NOTE — ASSESSMENT & PLAN NOTE
Hyperlipidemia well controlled on atorvastatin 20 mg daily.  Labs reviewed viewed with patient, normal, no changes to medication.

## 2021-11-24 ENCOUNTER — OFFICE VISIT (OUTPATIENT)
Dept: OBSTETRICS AND GYNECOLOGY | Facility: CLINIC | Age: 66
End: 2021-11-24

## 2021-11-24 VITALS — DIASTOLIC BLOOD PRESSURE: 72 MMHG | SYSTOLIC BLOOD PRESSURE: 122 MMHG | BODY MASS INDEX: 27.25 KG/M2 | HEIGHT: 67 IN

## 2021-11-24 DIAGNOSIS — Z12.31 ENCOUNTER FOR SCREENING MAMMOGRAM FOR MALIGNANT NEOPLASM OF BREAST: ICD-10-CM

## 2021-11-24 DIAGNOSIS — Z13.9 SCREENING FOR CONDITION: Primary | ICD-10-CM

## 2021-11-24 DIAGNOSIS — Z78.0 ASYMPTOMATIC MENOPAUSAL STATE: ICD-10-CM

## 2021-11-24 LAB
BILIRUB BLD-MCNC: NEGATIVE MG/DL
CLARITY, POC: CLEAR
COLOR UR: YELLOW
GLUCOSE UR STRIP-MCNC: NEGATIVE MG/DL
KETONES UR QL: NEGATIVE
LEUKOCYTE EST, POC: NEGATIVE
NITRITE UR-MCNC: NEGATIVE MG/ML
PH UR: 5 [PH] (ref 5–8)
PROT UR STRIP-MCNC: NEGATIVE MG/DL
RBC # UR STRIP: NEGATIVE /UL
SP GR UR: 1 (ref 1–1.03)
UROBILINOGEN UR QL: NORMAL

## 2021-11-24 PROCEDURE — 81002 URINALYSIS NONAUTO W/O SCOPE: CPT | Performed by: OBSTETRICS & GYNECOLOGY

## 2021-11-24 PROCEDURE — G0101 CA SCREEN;PELVIC/BREAST EXAM: HCPCS | Performed by: OBSTETRICS & GYNECOLOGY

## 2021-11-24 RX ORDER — NYSTATIN AND TRIAMCINOLONE ACETONIDE 100000; 1 [USP'U]/G; MG/G
OINTMENT TOPICAL
Qty: 60 G | Refills: 0 | Status: SHIPPED | OUTPATIENT
Start: 2021-11-24 | End: 2022-06-09

## 2021-11-24 RX ORDER — ESTRADIOL 0.1 MG/G
1 CREAM VAGINAL 2 TIMES WEEKLY
Qty: 45 G | Refills: 0 | Status: SHIPPED | OUTPATIENT
Start: 2021-11-25 | End: 2022-04-12 | Stop reason: SDUPTHER

## 2021-11-24 NOTE — PROGRESS NOTES
GYN Annual Exam     CC- Here for annual exam.     Lou Kimball is a 66 y.o. female est pt who presents for annual well woman exam. Her vaginal dryness and tearing are better with E2 cream and prn Lotrisone.. She had a total vaginal hysterectomy with ovarian conservation at age 43 for fibroids.  She took HRT for about 7 years but is not on anything now.She denies any  VB or bladder issues. She is getting ready to see her endocrinologist and may change her diabetes meds. Her vaginal irritation only started when she changed to Jardiance.       OB History        8    Para   2    Term   2            AB   6    Living   2       SAB   6    IAB        Ectopic        Molar        Multiple        Live Births              Obstetric Comments   2   6 SAB with 4 D&C             Menarche:9  Menopause:43- TVH with OC for fibroids  HRT:tok for 7 years ,none now  Current contraception: post menopausal status and status post hysterectomy  History of abnormal Pap smear: no   History of abnormal mammogram: yes - s/p 2 breast bx, L side, both B9  Family history of uterine, colon or ovarian cancer: no  Family history of breast cancer: no  STD's: none   Last pap smear: 2020- normal pap  Gardasil: none  EZEKIEL: mom DVT   E2 cream for atrophy      Health Maintenance   Topic Date Due   • DIABETIC FOOT EXAM  2020   • COVID-19 Vaccine (3 - Booster for Moderna series) 2021   • ANNUAL WELLNESS VISIT  10/07/2021   • DXA SCAN  2021   • DIABETIC EYE EXAM  2022   • HEMOGLOBIN A1C  05/15/2022   • COLORECTAL CANCER SCREENING  2022   • LIPID PANEL  11/15/2022   • URINE MICROALBUMIN  11/15/2022   • MAMMOGRAM  2023   • TDAP/TD VACCINES (2 - Td or Tdap) 2029   • HEPATITIS C SCREENING  Completed   • INFLUENZA VACCINE  Completed   • Pneumococcal Vaccine 65+  Completed   • ZOSTER VACCINE  Completed       Past Medical History:   Diagnosis Date   • Asthma    • Bronchitis 2020   • Diabetes  mellitus (HCC)    • Fibroid    • GERD (gastroesophageal reflux disease)    • Hyperlipidemia        Past Surgical History:   Procedure Laterality Date   • ANKLE ARTHROSCOPY Right    • BREAST BIOPSY Right 2017    stereotatctic   • BREAST EXCISIONAL BIOPSY Right 1979   • CATARACT EXTRACTION, BILATERAL  05/17/2019    and 5/21/2019   • CHOLECYSTECTOMY     • D & C WITH SUCTION      x 4   • HYSTERECTOMY      TVH with OC for fibroids   • KNEE ARTHROSCOPY Bilateral    • MOUTH SURGERY  03/12/2020   • TUBAL ABDOMINAL LIGATION  1986   • WISDOM TOOTH EXTRACTION           Current Outpatient Medications:   •  albuterol sulfate  (90 Base) MCG/ACT inhaler, Inhale 2 puffs Every 4 (Four) Hours As Needed for Wheezing., Disp: 1 inhaler, Rfl: 1  •  aspirin 81 MG chewable tablet, Chew 81 mg Daily., Disp: , Rfl:   •  atorvastatin (LIPITOR) 20 MG tablet, Take 1 tablet by mouth every night at bedtime., Disp: 90 tablet, Rfl: 1  •  Cholecalciferol (VITAMIN D3) 5000 units capsule capsule, Take 5,000 Units by mouth Daily., Disp: , Rfl:   •  empagliflozin (JARDIANCE) 10 MG tablet tablet, Take 1 tablet by mouth Daily., Disp: 90 tablet, Rfl: 1  •  [START ON 11/25/2021] estradiol (ESTRACE) 0.1 MG/GM vaginal cream, Insert 1 g into the vagina 2 (Two) Times a Week., Disp: 45 g, Rfl: 0  •  famotidine (PEPCID) 20 MG tablet, Take 20 mg by mouth Every Other Day. Pt alternates with prilosec, Disp: , Rfl:   •  metFORMIN ER (GLUCOPHAGE-XR) 500 MG 24 hr tablet, TAKE 2 TABLETS BY MOUTH IN THE MORNING AND AFTERNOON, Disp: 360 tablet, Rfl: 1  •  montelukast (SINGULAIR) 10 MG tablet, Take 1 tablet by mouth Every Night., Disp: 90 tablet, Rfl: 1  •  nystatin-triamcinolone (MYCOLOG) 640005-0.1 UNIT/GM-% ointment, Apply to affected area BID x 3 days prn itching, Disp: 60 g, Rfl: 0  •  omeprazole (priLOSEC) 20 MG capsule, Take 20 mg by mouth Every Other Day. Pt alternates prilosec with pepcid, Disp: , Rfl:   •  SITagliptin (JANUVIA) 100 MG tablet, Take 1 tablet  "by mouth Daily., Disp: 90 tablet, Rfl: 1    Allergies   Allergen Reactions   • Codeine Hives and GI Intolerance   • Dextromethorphan Hives   • Phenergan [Promethazine Hcl] Hives   • Tetracyclines & Related Hives       Social History     Tobacco Use   • Smoking status: Never Smoker   • Smokeless tobacco: Never Used   Substance Use Topics   • Alcohol use: No   • Drug use: No       Family History   Problem Relation Age of Onset   • Diabetes Mother    • COPD Mother    • COPD Father    • Heart disease Father    • Lymphoma Father    • Heart attack Father    • Pancreatitis Sister    • Pancreatitis Brother    • Cancer Maternal Grandfather         bladder   • Lung cancer Paternal Grandfather    • Diabetes Paternal Uncle    • Breast cancer Neg Hx    • Colon cancer Neg Hx    • Ovarian cancer Neg Hx    • Pulmonary embolism Neg Hx    • Deep vein thrombosis Neg Hx        Review of Systems   Constitutional: Positive for activity change. Negative for appetite change, fatigue, fever and unexpected weight change.   Eyes: Negative for photophobia and visual disturbance.   Respiratory: Negative for cough and shortness of breath.    Cardiovascular: Negative for chest pain and palpitations.   Gastrointestinal: Negative for abdominal distention, abdominal pain, constipation, diarrhea and nausea.   Endocrine: Negative for cold intolerance and heat intolerance.   Genitourinary: Negative for dyspareunia, dysuria, menstrual problem, pelvic pain, vaginal discharge and vaginal pain.   Musculoskeletal: Negative for back pain.   Skin: Negative for color change and rash.   Allergic/Immunologic: Positive for environmental allergies.   Neurological: Negative for headaches.   Hematological: Negative for adenopathy. Does not bruise/bleed easily.   Psychiatric/Behavioral: Negative for dysphoric mood. The patient is not nervous/anxious.    All other systems reviewed and are negative.      /72   Ht 170.2 cm (67\")   LMP  (LMP Unknown)   BMI 27.25 " kg/m²     Physical Exam   Constitutional: She is oriented to person, place, and time. She appears well-developed.   HENT:   Head: Normocephalic and atraumatic.   Eyes: Conjunctivae are normal. No scleral icterus.   Neck: No thyromegaly present.   Cardiovascular: Normal rate and regular rhythm.   Pulmonary/Chest: Effort normal and breath sounds normal. Right breast exhibits no inverted nipple, no mass, no nipple discharge, no skin change and no tenderness. Left breast exhibits no inverted nipple, no mass, no nipple discharge, no skin change and no tenderness.   Abdominal: Soft. Normal appearance and bowel sounds are normal. She exhibits no distension and no mass. There is no abdominal tenderness. There is no rebound and no guarding. No hernia.   Genitourinary:    Rectum normal.      Pelvic exam was performed with patient supine.   There is no rash, tenderness, lesion or injury on the right labia. There is no rash, tenderness, lesion or injury on the left labia. Right adnexum displays no mass, no tenderness and no fullness. Left adnexum displays no mass, no tenderness and no fullness.    No vaginal discharge, erythema, tenderness or bleeding.   No erythema, tenderness or bleeding in the vagina.    No foreign body in the vagina.      No signs of injury in the vagina.      Genitourinary Comments: Mild atrophy noted  Normal cuff- uterus and cervix absent  No masses     Neurological: She is alert and oriented to person, place, and time.   Skin: Skin is warm and dry.   Psychiatric: Her behavior is normal. Mood, judgment and thought content normal.   Nursing note and vitals reviewed.         Assessment/Plan    1) GYN HM:  Normal pap 6/2020  SBE demonstrated and encouraged.  2) STD screening: declines Condoms encouraged.  3) Bone health - Weight bearing exercise, dietary calcium recommendations and vitamin D reviewed.   4) Diet and Exercise discussed  5) Smoking Status: No  6) Vaginal atrophy and pain- improved with E2 cream  and prn lotrisone. Refills of both called in.  7)MMG: UTD 11/2021- b2. Repeat MMG 11/2022  8) DEXA-UTD UTD 11/2019- osteopenia, fracture 17 and 2.3% respectively.  Patient on calcium and vitamin D.  Declines any additional treatment now. Repeat DEXA 11/2022  9)C scope-UTD 9/2012- repeat 9/2022. Pt already has scope info for the fall  10) I saw the patient with a face mask, gloves and eye protection.  The patient herself was masked.  Social distancing was observed as appropriate.   11)Parts of this document have been copied or forwarded from her previous visits and have been reviewed, updated and edited as indicated.   12)I saw the patient with a face mask, gloves and eye protection  The patient herself was masked.  Social distancing was observed as appropriate.  13) RTO 2 years annual or prn.        Diagnoses and all orders for this visit:    1. Screening for condition (Primary)  -     POC Urinalysis Dipstick  -     Mammo Screening Bilateral With CAD; Future  -     DEXA Bone Density Axial; Future    2. Encounter for screening mammogram for malignant neoplasm of breast   -     Mammo Screening Bilateral With CAD; Future    3. Asymptomatic menopausal state   -     DEXA Bone Density Axial; Future    Other orders  -     estradiol (ESTRACE) 0.1 MG/GM vaginal cream; Insert 1 g into the vagina 2 (Two) Times a Week.  Dispense: 45 g; Refill: 0  -     nystatin-triamcinolone (MYCOLOG) 074218-8.1 UNIT/GM-% ointment; Apply to affected area BID x 3 days prn itching  Dispense: 60 g; Refill: 0        Keila Green MD  11/24/2021    09:20 EST

## 2021-12-06 DIAGNOSIS — J45.20 MILD INTERMITTENT REACTIVE AIRWAY DISEASE WITHOUT COMPLICATION: ICD-10-CM

## 2021-12-06 RX ORDER — MONTELUKAST SODIUM 10 MG/1
TABLET ORAL
Qty: 90 TABLET | Refills: 1 | Status: SHIPPED | OUTPATIENT
Start: 2021-12-06 | End: 2022-05-23

## 2021-12-20 ENCOUNTER — OFFICE VISIT (OUTPATIENT)
Dept: ENDOCRINOLOGY | Age: 66
End: 2021-12-20

## 2021-12-20 VITALS
DIASTOLIC BLOOD PRESSURE: 71 MMHG | RESPIRATION RATE: 20 BRPM | OXYGEN SATURATION: 100 % | BODY MASS INDEX: 27.56 KG/M2 | WEIGHT: 175.6 LBS | SYSTOLIC BLOOD PRESSURE: 141 MMHG | HEART RATE: 80 BPM | HEIGHT: 67 IN

## 2021-12-20 DIAGNOSIS — E55.9 VITAMIN D DEFICIENCY: ICD-10-CM

## 2021-12-20 DIAGNOSIS — R03.0 ELEVATED BP WITHOUT DIAGNOSIS OF HYPERTENSION: ICD-10-CM

## 2021-12-20 DIAGNOSIS — E78.5 DYSLIPIDEMIA: ICD-10-CM

## 2021-12-20 DIAGNOSIS — E78.41 ELEVATED LIPOPROTEIN(A): ICD-10-CM

## 2021-12-20 DIAGNOSIS — E11.9 TYPE 2 DIABETES MELLITUS WITHOUT COMPLICATION, WITHOUT LONG-TERM CURRENT USE OF INSULIN (HCC): Primary | ICD-10-CM

## 2021-12-20 DIAGNOSIS — R71.8 ELEVATED HEMATOCRIT: ICD-10-CM

## 2021-12-20 PROCEDURE — 99204 OFFICE O/P NEW MOD 45 MIN: CPT | Performed by: INTERNAL MEDICINE

## 2021-12-20 RX ORDER — ATORVASTATIN CALCIUM 20 MG/1
20 TABLET, FILM COATED ORAL
Qty: 90 TABLET | Refills: 1 | Status: SHIPPED | OUTPATIENT
Start: 2021-12-20 | End: 2023-01-16

## 2021-12-20 RX ORDER — METFORMIN HYDROCHLORIDE 500 MG/1
TABLET, EXTENDED RELEASE ORAL
Qty: 360 TABLET | Refills: 1 | Status: SHIPPED | OUTPATIENT
Start: 2021-12-20 | End: 2022-08-24 | Stop reason: SDUPTHER

## 2021-12-20 NOTE — PATIENT INSTRUCTIONS
TYPE 2 DM:  1. Check fingerstick glucose as directed. Bring glucose log to every visit.  2. Restrict carb intake. Consider IF at 8/16  3. Establish/maintain/intensify and exercise routine  4. Medication: continue metformin, jardiance and januvia wih same instructions.   5. Diabetic foot care as directed  6. Follow up with ophthalmology per schedule for comprehensive diabetic eye exam   7. RTC with labs drawn one week before the follow up appointment  8. RTC in 3 months    HIGH BP:  Monitor BP and log. Bring log at next visit    HLD: Continue lifestyle changes. Continue statin therapy.

## 2021-12-20 NOTE — PROGRESS NOTES
Chief Complaint  Diabetes (type 2) and Hyperlipidemia    Subjective          Joshua Puentes presents to [facility] ENDOCRINOLOGY  History of Present Illness    Patient presents for evaluation and treatment recommendations for diabetes.  Diagnosed: 15 years ago.    The patient moved to this area in 2017.  Her diabetes care was transitioned to Dr. Gomes in 2018.  Dr. Gomes made some changes in her medications for diabetes and the patient reports that the blood sugars since that time have been higher than before.      Previously the patient was on Metformin 1000 mg daily and Actos.  She reports her hemoglobin A1c ranged from 5 -6%.  Her Actos was discontinued and Metformin dose was increased.    Following discontinuation of Actos, the patient lost about 40 pounds, ie 210 to 175 lb.      DIET: ketodiet    EXERCISE: walks 3-5 miles a day (10-12 lb daily when in [place])    WEIGHT: Stable.  Patient monitors her weight on her home scale.  This morning she was 175.6 lb.    DM MEDICATION REGIMEN:  Off actos  januvia 100 milligrams daily.  jardiance 10 milligrams daily.  Metformin 2000 mg daily in divided doses.    Compliance: good    Statin therapy: Atorvastatin (changed from previous simvastatin).    BP regimen: none    Patient usually does not check her fingerstick glucose.  She has checked her glucose readings over the past 1 week for this appointment.    -145 mg/dL  Before lunch 120 mg/dL  Before dinner NA  Bed time 121 mg/dL    Hypoglycemia awareness: yes  No rececnt episodes.  Episodes of severe hypoglycemia: denies  Hospitalization with HONK/ DKA: denies    Macrovascular complications:  - Cardiovascular disease: denies  - Cerebrovascular disease: denies  - Peripheral artrial disease: denies    Microvascular complications:  - Diabetic retinopathy: denies  Last comprehensive diabetic eye exam 4/2021  - Diabetic nephropathy: denies  - Diabetic neuropathy: denies    Results for JOSHUA PUENTES (MRN  "[id number]) as of 12/20/2021 08:16   Ref. Range 7/22/2021 08:21 11/15/2021 08:09   Hemoglobin A1C Latest Ref Range: 4.8 - 5.6 % 7.7 7.5 (H)       Review of Systems is as per history of presenting illness otherwise negative.    Objective   Vital Signs:   /71   Pulse 80   Resp 20   Ht 170.2 cm (67\")   Wt 79.7 kg (175 lb 9.6 oz)   SpO2 100%   BMI 27.50 kg/m²     Physical Exam  Vitals and nursing note reviewed.   Constitutional:       General: She is not in acute distress.     Appearance: She is not ill-appearing, toxic-appearing or diaphoretic.   HENT:      Head: Normocephalic and atraumatic.      Nose: Nose normal.      Mouth/Throat:      Mouth: Mucous membranes are moist.      Pharynx: Oropharynx is clear. No oropharyngeal exudate or posterior oropharyngeal erythema.   Eyes:      General: No scleral icterus.     Extraocular Movements: Extraocular movements intact.      Conjunctiva/sclera: Conjunctivae normal.      Pupils: Pupils are equal, round, and reactive to light.   Neck:      Thyroid: No thyroid mass, thyromegaly or thyroid tenderness.      Vascular: No carotid bruit.      Trachea: Trachea normal.   Cardiovascular:      Rate and Rhythm: Normal rate and regular rhythm.      Pulses: Normal pulses.      Heart sounds: Normal heart sounds. No murmur heard.  No friction rub. No gallop.    Pulmonary:      Effort: Pulmonary effort is normal. No respiratory distress.      Breath sounds: Normal breath sounds. No stridor. No wheezing, rhonchi or rales.   Chest:      Chest wall: No tenderness.   Abdominal:      General: Bowel sounds are normal. There is no distension.      Palpations: Abdomen is soft. There is no mass.      Tenderness: There is no abdominal tenderness. There is no rebound.   Musculoskeletal:         General: No swelling, tenderness, deformity or signs of injury. Normal range of motion.      Cervical back: Normal range of motion and neck supple. No rigidity or tenderness.      Right lower leg: " No edema.      Left lower leg: No edema.   Lymphadenopathy:      Cervical: No cervical adenopathy.   Skin:     General: Skin is warm and dry.      Capillary Refill: Capillary refill takes 2 to 3 seconds.      Coloration: Skin is not jaundiced or pale.      Findings: No bruising, erythema, lesion or rash.   Neurological:      General: No focal deficit present.      Mental Status: She is alert and oriented to person, place, and time. Mental status is at baseline.      Cranial Nerves: No cranial nerve deficit.      Sensory: No sensory deficit.      Motor: No weakness.      Coordination: Coordination normal.      Gait: Gait normal.      Deep Tendon Reflexes: Reflexes normal.   Psychiatric:         Mood and Affect: Mood normal.         Behavior: Behavior normal.         Thought Content: Thought content normal.         Judgment: Judgment normal.        Diabetic foot exam:   Left: Filament test present   Pulses Dorsalis Pedis:  present   Reflexes 2+    Vibratory sensation normal   Proprioception normal   Sharp/dull discrimination normal       Right: Filament test present   Pulses Dorsalis Pedis:  present   Reflexes 2+    Vibratory sensation normal   Proprioception normal   Sharp/dull discrimination normal     Result Review :{Labs  Result Review  Imaging  Med Tab  Media  Procedures           Results for JOSHUA PUENTES (MRN [id number]) as of 12/20/2021 08:16   Ref. Range 11/15/2021 08:09   Glucose Latest Ref Range: 65 - 99 mg/dL 130 (H)   Sodium Latest Ref Range: 134 - 144 mmol/L 140   Potassium Latest Ref Range: 3.5 - 5.2 mmol/L 4.8   CO2 Latest Ref Range: 20 - 29 mmol/L 22   Chloride Latest Ref Range: 96 - 106 mmol/L 103   Creatinine Latest Ref Range: 0.57 - 1.00 mg/dL 0.88   BUN Latest Ref Range: 8 - 27 mg/dL 13   BUN/Creatinine Ratio Latest Ref Range: 12 - 28  15   Calcium Latest Ref Range: 8.7 - 10.3 mg/dL 10.0   eGFR Non African Am Latest Ref Range: >59 mL/min/1.73 68   eGFR African Am Latest Ref Range: >59  mL/min/1.73 78   Alkaline Phosphatase Latest Ref Range: 44 - 121 IU/L 100   Total Protein Latest Ref Range: 6.0 - 8.5 g/dL 6.7   ALT (SGPT) Latest Ref Range: 0 - 32 IU/L 23   AST (SGOT) Latest Ref Range: 0 - 40 IU/L 14   Total Bilirubin Latest Ref Range: 0.0 - 1.2 mg/dL 0.6   Albumin Latest Ref Range: 3.8 - 4.8 g/dL 4.5   A/G Ratio Latest Ref Range: 1.2 - 2.2  2.0   Hemoglobin A1C Latest Ref Range: 4.8 - 5.6 % 7.5 (H)   Iron Latest Ref Range: 27 - 139 ug/dL 71   Ferritin Latest Ref Range: 15 - 150 ng/mL 49   Iron Saturation Latest Ref Range: 15 - 55 % 20   TIBC Latest Ref Range: 250 - 450 ug/dL 350   UIBC Latest Ref Range: 118 - 369 ug/dL 279   Folate Latest Ref Range: >3.0 ng/mL 11.0   Total Cholesterol Latest Ref Range: 100 - 199 mg/dL 167   HDL Cholesterol Latest Ref Range: >39 mg/dL 49   LDL Cholesterol  Latest Ref Range: 0 - 99 mg/dL 89   Triglycerides Latest Ref Range: 0 - 149 mg/dL 170 (H)   VLDL Cholesterol Fede Latest Ref Range: 5 - 40 mg/dL 29   Vitamin B-12 Latest Ref Range: 232 - 1,245 pg/mL 306   Globulin Latest Ref Range: 1.5 - 4.5 g/dL 2.2   WBC Latest Ref Range: 3.4 - 10.8 x10E3/uL 7.7   RBC Latest Ref Range: 3.77 - 5.28 x10E6/uL 5.50 (H)   Hemoglobin Latest Ref Range: 11.1 - 15.9 g/dL 16.3 (H)   Hematocrit Latest Ref Range: 34.0 - 46.6 % 47.4 (H)   RDW Latest Ref Range: 11.7 - 15.4 % 13.2   MCV Latest Ref Range: 79 - 97 fL 86   MCH Latest Ref Range: 26.6 - 33.0 pg 29.6   MCHC Latest Ref Range: 31.5 - 35.7 g/dL 34.4   Platelets Latest Ref Range: 150 - 450 x10E3/uL 330   Neutrophil Rel % Latest Ref Range: Not Estab. % 64   Lymphocyte Rel % Latest Ref Range: Not Estab. % 24   Monocyte Rel % Latest Ref Range: Not Estab. % 8   Eosinophil Rel % Latest Ref Range: Not Estab. % 2   Basophil Rel % Latest Ref Range: Not Estab. % 1   Immature Granulocyte Rel % Latest Ref Range: Not Estab. % 1   Neutrophils Absolute Latest Ref Range: 1.4 - 7.0 x10E3/uL 5.0   Lymphocytes Absolute Latest Ref Range: 0.7 - 3.1  x10E3/uL 1.9   Monocytes Absolute Latest Ref Range: 0.1 - 0.9 x10E3/uL 0.6   Eosinophils Absolute Latest Ref Range: 0.0 - 0.4 x10E3/uL 0.2   Basophils Absolute Latest Ref Range: 0.0 - 0.2 x10E3/uL 0.1   Immature Grans, Absolute Latest Ref Range: 0.0 - 0.1 x10E3/uL 0.0     Assessment and Plan    There are no diagnoses linked to this encounter.    I spent 35 minutes caring for Lou on this date of service. This time includes time spent by me in the following activities:reviewing tests, obtaining and/or reviewing a separately obtained history, performing a medically appropriate examination and/or evaluation , counseling and educating the patient/family/caregiver, ordering medications, tests, or procedures, referring and communicating with other health care professionals , documenting information in the medical record and independently interpreting results and communicating that information with the patient/family/caregiver  Follow Up   No follow-ups on file.  Patient was given instructions and counseling regarding her condition or for health maintenance advice. Please see specific information pulled into the AVS if appropriate.     TYPE 2 DM:  1. Check fingerstick glucose as directed. Bring glucose log to every visit.  2. Restrict carb intake. Consider IF at 8/16  3. Establish/maintain/intensify and exercise routine.  She is about to leave for [place], where she has a more intense exercise routine.  4. Medication: continue metformin, jardiance and januvia wih same instructions.   5. Diabetic foot care as directed  6. Follow up with ophthalmology per schedule for comprehensive diabetic eye exam   7. RTC with labs drawn one week before the follow up appointment  8. RTC in 3-4 months    We will intensify the lifestyle changes, with a weight loss goal of 15 pounds over the next 3 to 6 months.  With these changes, we anticipate her blood sugars to improve.  We will continue the same medications at current  doses.    HIGH BP:  Monitor BP and log. Bring log at next visit    HLD:   Continue lifestyle changes. Continue statin therapy.

## 2022-04-11 NOTE — PROGRESS NOTES
Chief Complaint  Diabetes (Type 2)    Interval history is negative for any new symptoms, ER visits or hospitalizations.  She presents for follow-up for type 2 diabetes.  She returned from a 4 months stay in [place].    Diet; moderate restriction of carbs  Exercise; daily walks and games  Medications;  metformin, jardiance and januvia   - She has a perineal rash and her oncologist says her polycythemia is likely due to Jardiance.  - She has a strong family history of three immediate family members with pancreatitis.  - In the past she gained 40 lb on Actos    Blood sugar readings;  - She did not bring her log.  -  or lower mg/dL per recollection  Labs; as below.   Latest Reference Range & Units 07/22/21 08:21 11/15/21 08:09 04/13/22 11:36   Hemoglobin A1C 4.8 - 5.6 % 7.7 7.5 (H) [1] 7.9 (H) [2]      Latest Reference Range & Units 11/15/21 08:09 04/13/22 11:36   Alkaline Phosphatase 44 - 121 IU/L 100 [1] 125 (H)   Total Protein 6.0 - 8.5 g/dL 6.7 7.1   ALT (SGPT) 0 - 32 IU/L 23 36 (H)   AST (SGOT) 0 - 40 IU/L 14 22      Latest Reference Range & Units 11/15/21 08:09 04/13/22 11:36   Hemoglobin 11.1 - 15.9 g/dL 16.3 (H) 16.3 (H)   Hematocrit 34.0 - 46.6 % 47.4 (H) 49.1 (H)     Rest of labs as below.    Subjective          Lou Kimball presents to [facility] ENDOCRINOLOGY  History of Present Illness      Diabetes (type 2) and Hyperlipidemia    Subjective          Lou Kimball presents to [facility] ENDOCRINOLOGY  History of Present Illness    Patient presents for evaluation and treatment recommendations for diabetes.  Diagnosed: 15 years ago.    The patient moved to this area in 2017.  Her diabetes care was transitioned to Dr. Gomes in 2018.  Dr. Gomes made some changes in her medications for diabetes and the patient reports that the blood sugars since that time have been higher than before.      Previously the patient was on Metformin 1000 mg daily and Actos.  She reports  "her hemoglobin A1c ranged from 5 -6%.  Her Actos was discontinued and Metformin dose was increased.    Following discontinuation of Actos, the patient lost about 40 pounds, ie 210 to 175 lb.      DIET: ketodiet    EXERCISE: walks 3-5 miles a day (10-12 lb daily when in [place])    WEIGHT: Stable.  Patient monitors her weight on her home scale.  This morning she was 175.6 lb.    DM MEDICATION REGIMEN:  Off actos  januvia 100 milligrams daily.  jardiance 10 milligrams daily.  Metformin 2000 mg daily in divided doses.    Compliance: good    Statin therapy: Atorvastatin (changed from previous simvastatin).    BP regimen: none    Patient usually does not check her fingerstick glucose.  She has checked her glucose readings over the past 1 week for this appointment.    -145 mg/dL  Before lunch 120 mg/dL  Before dinner NA  Bed time 121 mg/dL    Hypoglycemia awareness: yes  No rececnt episodes.  Episodes of severe hypoglycemia: denies  Hospitalization with HONK/ DKA: denies    Macrovascular complications:  - Cardiovascular disease: denies  - Cerebrovascular disease: denies  - Peripheral artrial disease: denies    Microvascular complications:  - Diabetic retinopathy: denies  Last comprehensive diabetic eye exam 4/2021  - Diabetic nephropathy: denies  - Diabetic neuropathy: denies    Results for JOSHUA PUENTES (MRN [id number]) as of 12/20/2021 08:16   Ref. Range 7/22/2021 08:21 11/15/2021 08:09   Hemoglobin A1C Latest Ref Range: 4.8 - 5.6 % 7.7 7.5 (H)       Review of Systems is as per history of presenting illness otherwise negative.        Objective   Vital Signs:   /68   Pulse 77   Ht 170.2 cm (67.01\")   SpO2 98%   BMI 27.50 kg/m²     Physical Exam       General: She is not in acute distress.     Appearance: She is not ill-appearing, toxic-appearing or diaphoretic.   HENT:      Head: Normocephalic and atraumatic.      Nose: Nose normal.      Mouth/Throat:      Mouth: Mucous membranes are moist.      Pharynx: " Oropharynx is clear. No oropharyngeal exudate or posterior oropharyngeal erythema.   Eyes:      General: No scleral icterus.     Extraocular Movements: Extraocular movements intact.      Conjunctiva/sclera: Conjunctivae normal.      Pupils: Pupils are equal, round, and reactive to light.   Neck:      Thyroid: No thyroid mass, thyromegaly or thyroid tenderness.      Vascular: No carotid bruit.      Trachea: Trachea normal.   Cardiovascular:      Rate and Rhythm: Normal rate and regular rhythm.      Pulses: Normal pulses.      Heart sounds: Normal heart sounds. No murmur heard.  No friction rub. No gallop.    Pulmonary:      Effort: Pulmonary effort is normal. No respiratory distress.      Breath sounds: Normal breath sounds. No stridor. No wheezing, rhonchi or rales.   Chest:      Chest wall: No tenderness.   Abdominal:      General: Bowel sounds are normal. There is no distension.      Palpations: Abdomen is soft. There is no mass.      Tenderness: There is no abdominal tenderness. There is no rebound.   Musculoskeletal:         General: No swelling, tenderness, deformity or signs of injury. Normal range of motion.      Cervical back: Normal range of motion and neck supple. No rigidity or tenderness.      Right lower leg: No edema.      Left lower leg: No edema.   Lymphadenopathy:      Cervical: No cervical adenopathy.   Skin:     General: Skin is warm and dry.      Capillary Refill: Capillary refill takes 2 to 3 seconds.      Coloration: Skin is not jaundiced or pale.      Findings: No bruising, erythema, lesion or rash.   Neurological:      General: No focal deficit present.      Mental Status: She is alert and oriented to person, place, and time. Mental status is at baseline.      Cranial Nerves: No cranial nerve deficit.      Sensory: No sensory deficit.      Motor: No weakness.      Coordination: Coordination normal.      Gait: Gait normal.      Deep Tendon Reflexes: Reflexes normal.   Psychiatric:         Mood  and Affect: Mood normal.         Behavior: Behavior normal.         Thought Content: Thought content normal.         Judgment: Judgment normal.        Diabetic foot exam:   Left: Filament test present   Pulses Dorsalis Pedis:  present   Reflexes 2+    Vibratory sensation normal   Proprioception normal   Sharp/dull discrimination normal     Right: Filament test present   Pulses Dorsalis Pedis:  present   Reflexes 2+    Vibratory sensation normal   Proprioception normal   Sharp/dull discrimination normal     Result Review :     CMP    CMP 11/15/21 4/13/22   Glucose 130 (A) 128 (A)   BUN 13 14   Creatinine 0.88 0.81   eGFR Non  Am 68    eGFR African Am 78    Sodium 140 141   Potassium 4.8 4.9   Chloride 103 100   Calcium 10.0 10.2   Total Protein 6.7 7.1   Albumin 4.5 4.8   Globulin 2.2 2.3   Total Bilirubin 0.6 0.7   Alkaline Phosphatase 100 125 (A)   AST (SGOT) 14 22   ALT (SGPT) 23 36 (A)   (A) Abnormal value       Comments are available for some flowsheets but are not being displayed.           Lipid Panel    Lipid Panel 11/15/21 4/13/22   Total Cholesterol 167 155   Triglycerides 170 (A) 171 (A)   HDL Cholesterol 49 51   VLDL Cholesterol 29 29   LDL Cholesterol  89 75   (A) Abnormal value                Most Recent A1C    HGBA1C Most Recent 4/13/22   Hemoglobin A1C 7.9 (A)   (A) Abnormal value       Comments are available for some flowsheets but are not being displayed.               A1C Last 3 Results    HGBA1C Last 3 Results 7/22/21 11/15/21 4/13/22   Hemoglobin A1C 7.7 7.5 (A) 7.9 (A)   (A) Abnormal value       Comments are available for some flowsheets but are not being displayed.           Microalbumin    Microalbumin 11/15/21 4/13/22   Microalbumin, Urine 3.7 <3.0      Comments are available for some flowsheets but are not being displayed.                     Assessment and Plan    Diagnoses and all orders for this visit:    1. Type 2 diabetes mellitus without complication, without long-term current  use of insulin (HCC) (Primary)  -     Comprehensive Metabolic Panel; Future    2. Dyslipidemia  -     Comprehensive Metabolic Panel; Future    3. Elevated BP without diagnosis of hypertension  -     Comprehensive Metabolic Panel; Future    4. Vitamin D deficiency    5. Other insomnia  -     Ambulatory Referral to Sleep Lab    6. Abnormal LFTs  -     Comprehensive Metabolic Panel; Future    7. Polycythemia  -     CBC Auto Differential; Future         Assessment and Plan    There are no diagnoses linked to this encounter.    I spent 35 minutes caring for Lou on this date of service. This time includes time spent by me in the following activities:reviewing tests, obtaining and/or reviewing a separately obtained history, performing a medically appropriate examination and/or evaluation , counseling and educating the patient/family/caregiver, ordering medications, tests, or procedures, referring and communicating with other health care professionals , documenting information in the medical record and independently interpreting results and communicating that information with the patient/family/caregiver  Follow Up   No follow-ups on file.  Patient was given instructions and counseling regarding her condition or for health maintenance advice. Please see specific information pulled into the AVS if appropriate.     TYPE 2 DM:  1. Check fingerstick glucose as directed. Bring glucose log to every visit.  2. Restrict carb intake. Consider IF at 8/16  3. Establish/maintain/intensify and exercise routine.  She is about to leave for [place], where she has a more intense exercise routine.  4. Medication: continue metformin and januvia.        We will discontinue Jardiance.        Will have her intensify lifestyle changes and bring her BS log with her at the next visit.  5. Diabetic foot care as directed  6. Follow up with ophthalmology per schedule for comprehensive diabetic eye exam   7. RTC with labs drawn one week before  the follow up appointment  8. RTC in 3-4 months    We will intensify the lifestyle changes, with a weight loss goal of 15 pounds over the next 3 to 6 months.  With these changes, we anticipate her blood sugars to improve.  We will continue the same medications at current doses.    POLYCYTHEMIA:  D/C Jardiance  Recheck CBC in 1 month    ABNORMAL LFTs:  Repeat LFTs in 1 months.  Notify PCP    INSOMNIA:  Ref to sleep medicine.  Trial of [place]    HIGH BP:  Monitor BP and log. Bring log at next visit    HLD:   Continue lifestyle changes. Continue statin therapy.    Follow Up   Return in about 4 weeks (around 5/23/2022).  Patient was given instructions and counseling regarding her condition or for health maintenance advice. Please see specific information pulled into the AVS if appropriate.

## 2022-04-12 RX ORDER — ESTRADIOL 0.1 MG/G
1 CREAM VAGINAL 2 TIMES WEEKLY
Qty: 45 G | Refills: 6 | Status: SHIPPED | OUTPATIENT
Start: 2022-04-14

## 2022-04-25 ENCOUNTER — OFFICE VISIT (OUTPATIENT)
Dept: ENDOCRINOLOGY | Age: 67
End: 2022-04-25

## 2022-04-25 VITALS
BODY MASS INDEX: 27.5 KG/M2 | HEIGHT: 67 IN | DIASTOLIC BLOOD PRESSURE: 68 MMHG | SYSTOLIC BLOOD PRESSURE: 124 MMHG | HEART RATE: 77 BPM | OXYGEN SATURATION: 98 %

## 2022-04-25 DIAGNOSIS — E55.9 VITAMIN D DEFICIENCY: ICD-10-CM

## 2022-04-25 DIAGNOSIS — G47.09 OTHER INSOMNIA: ICD-10-CM

## 2022-04-25 DIAGNOSIS — E78.5 DYSLIPIDEMIA: ICD-10-CM

## 2022-04-25 DIAGNOSIS — R79.89 ABNORMAL LFTS: ICD-10-CM

## 2022-04-25 DIAGNOSIS — E11.9 TYPE 2 DIABETES MELLITUS WITHOUT COMPLICATION, WITHOUT LONG-TERM CURRENT USE OF INSULIN: Primary | ICD-10-CM

## 2022-04-25 DIAGNOSIS — R03.0 ELEVATED BP WITHOUT DIAGNOSIS OF HYPERTENSION: ICD-10-CM

## 2022-04-25 DIAGNOSIS — D75.1 POLYCYTHEMIA: ICD-10-CM

## 2022-04-25 PROCEDURE — 99214 OFFICE O/P EST MOD 30 MIN: CPT | Performed by: INTERNAL MEDICINE

## 2022-05-23 ENCOUNTER — OFFICE VISIT (OUTPATIENT)
Dept: FAMILY MEDICINE CLINIC | Facility: CLINIC | Age: 67
End: 2022-05-23

## 2022-05-23 VITALS
BODY MASS INDEX: 28.09 KG/M2 | DIASTOLIC BLOOD PRESSURE: 72 MMHG | HEIGHT: 67 IN | HEART RATE: 83 BPM | OXYGEN SATURATION: 99 % | WEIGHT: 179 LBS | TEMPERATURE: 97.9 F | SYSTOLIC BLOOD PRESSURE: 112 MMHG

## 2022-05-23 DIAGNOSIS — Z00.00 MEDICARE ANNUAL WELLNESS VISIT, SUBSEQUENT: Primary | ICD-10-CM

## 2022-05-23 PROCEDURE — G0439 PPPS, SUBSEQ VISIT: HCPCS | Performed by: FAMILY MEDICINE

## 2022-05-23 PROCEDURE — 1170F FXNL STATUS ASSESSED: CPT | Performed by: FAMILY MEDICINE

## 2022-05-23 PROCEDURE — 1126F AMNT PAIN NOTED NONE PRSNT: CPT | Performed by: FAMILY MEDICINE

## 2022-05-23 PROCEDURE — 1159F MED LIST DOCD IN RCRD: CPT | Performed by: FAMILY MEDICINE

## 2022-05-23 NOTE — PROGRESS NOTES
The ABCs of the Annual Wellness Visit  Subsequent Medicare Wellness Visit    Chief Complaint   Patient presents with   • Medicare Wellness-subsequent     No complains doing well       Subjective    History of Present Illness:  Lou Kimball is a 66 y.o. female who presents for a Subsequent Medicare Wellness Visit.    The following portions of the patient's history were reviewed and   updated as appropriate: allergies, current medications, past family history, past medical history, past social history, past surgical history and problem list.    Compared to one year ago, the patient feels her physical   health is the same.    Compared to one year ago, the patient feels her mental   health is the same.    Recent Hospitalizations:  She was not admitted to the hospital during the last year.       Current Medical Providers:  Patient Care Team:  Caroline Gomes MD as PCP - General (Family Medicine)  Caroline Gomes MD as Referring Physician (Family Medicine)    Outpatient Medications Prior to Visit   Medication Sig Dispense Refill   • albuterol sulfate  (90 Base) MCG/ACT inhaler Inhale 2 puffs Every 4 (Four) Hours As Needed for Wheezing. 1 inhaler 1   • aspirin 81 MG chewable tablet Chew 81 mg Daily.     • atorvastatin (LIPITOR) 20 MG tablet Take 1 tablet by mouth every night at bedtime. 90 tablet 1   • Cholecalciferol (VITAMIN D3) 5000 units capsule capsule Take 5,000 Units by mouth Daily.     • estradiol (ESTRACE) 0.1 MG/GM vaginal cream Insert 1 g into the vagina 2 (Two) Times a Week. 45 g 6   • metFORMIN ER (GLUCOPHAGE-XR) 500 MG 24 hr tablet TAKE 2 TABLETS BY MOUTH IN THE MORNING AND AFTERNOON 360 tablet 1   • omeprazole (priLOSEC) 20 MG capsule Take 20 mg by mouth Every Other Day. Pt alternates prilosec with pepcid     • SITagliptin (JANUVIA) 100 MG tablet Take 1 tablet by mouth Daily. 90 tablet 1   • montelukast (SINGULAIR) 10 MG tablet TAKE 1 TABLET BY MOUTH EVERY DAY AT NIGHT 90 tablet 1   •  "nystatin-triamcinolone (MYCOLOG) 299168-2.1 UNIT/GM-% ointment Apply to affected area BID x 3 days prn itching 60 g 0     No facility-administered medications prior to visit.       No opioid medication identified on active medication list. I have reviewed chart for other potential  high risk medication/s and harmful drug interactions in the elderly.          Aspirin is on active medication list. Aspirin use is indicated based on review of current medical condition/s. Pros and cons of this therapy have been discussed today. Benefits of this medication outweigh potential harm.  Patient has been encouraged to continue taking this medication.  .      Patient Active Problem List   Diagnosis   • Type 2 diabetes mellitus without complication, without long-term current use of insulin (HCC)   • Hyperlipidemia   • Environmental allergies   • Reactive airway disease without complication   • GERD (gastroesophageal reflux disease)   • Elevated hematocrit     Advance Care Planning  Advance Directive is not on file.  ACP discussion was held with the patient during this visit. Patient has an advance directive (not in EMR), copy requested.          Objective    Vitals:    05/23/22 1440   BP: 112/72   Pulse: 83   Temp: 97.9 °F (36.6 °C)   SpO2: 99%   Weight: 81.2 kg (179 lb)   Height: 170.2 cm (67\")   PainSc: 0-No pain     BMI is >= 25 and < 30. (Overweight) The following options were offered after discussion: exercise counseling/recommendations and nutrition counseling/recommendations  Does the patient have evidence of cognitive impairment? No    Physical Exam  Lab Results   Component Value Date    CHLPL 155 04/13/2022    TRIG 171 (H) 04/13/2022    HDL 51 04/13/2022    LDL 75 04/13/2022    VLDL 29 04/13/2022    HGBA1C 7.9 (H) 04/13/2022            HEALTH RISK ASSESSMENT    Smoking Status:  Social History     Tobacco Use   Smoking Status Never Smoker   Smokeless Tobacco Never Used     Alcohol Consumption:  Social History "     Substance and Sexual Activity   Alcohol Use No     Fall Risk Screen:    MEIRADI Fall Risk Assessment was completed, and patient is at LOW risk for falls.Assessment completed on:5/23/2022    Depression Screening:  PHQ-2/PHQ-9 Depression Screening 5/23/2022   Retired PHQ-9 Total Score -   Retired Total Score -   Little Interest or Pleasure in Doing Things 0-->not at all   Feeling Down, Depressed or Hopeless 0-->not at all   PHQ-9: Brief Depression Severity Measure Score 0       Health Habits and Functional and Cognitive Screening:  Functional & Cognitive Status 5/23/2022   Do you have difficulty preparing food and eating? No   Do you have difficulty bathing yourself, getting dressed or grooming yourself? No   Do you have difficulty using the toilet? No   Do you have difficulty moving around from place to place? No   Do you have trouble with steps or getting out of a bed or a chair? No   Current Diet Well Balanced Diet        Current Diet Comment -   Dental Exam Up to date   Eye Exam Up to date   Exercise (times per week) 3 times per week   Current Exercises Include Walking   Current Exercise Activities Include -   Do you need help using the phone?  No   Are you deaf or do you have serious difficulty hearing?  No   Do you need help with transportation? No   Do you need help shopping? No   Do you need help preparing meals?  No   Do you need help with housework?  No   Do you need help with laundry? No   Do you need help taking your medications? No   Do you need help managing money? No   Do you ever drive or ride in a car without wearing a seat belt? No   Have you felt unusual stress, anger or loneliness in the last month? No   Who do you live with? Spouse   If you need help, do you have trouble finding someone available to you? No   Have you been bothered in the last four weeks by sexual problems? No   Do you have difficulty concentrating, remembering or making decisions? No       Age-appropriate Screening  Schedule:  Refer to the list below for future screening recommendations based on patient's age, sex and/or medical conditions. Orders for these recommended tests are listed in the plan section. The patient has been provided with a written plan.    Health Maintenance   Topic Date Due   • DIABETIC FOOT EXAM  12/19/2020   • DXA SCAN  11/06/2021   • DIABETIC EYE EXAM  04/12/2022   • INFLUENZA VACCINE  08/01/2022   • HEMOGLOBIN A1C  10/13/2022   • LIPID PANEL  04/13/2023   • URINE MICROALBUMIN  04/13/2023   • MAMMOGRAM  11/16/2023   • TDAP/TD VACCINES (2 - Td or Tdap) 03/27/2029   • ZOSTER VACCINE  Completed              Assessment & Plan   CMS Preventative Services Quick Reference  Risk Factors Identified During Encounter  Chronic Pain   Dementia/Memory   Immunizations Discussed/Encouraged (specific Immunizations; COVID in the fall  Inadequate Social Support, Isolation, Loneliness, Lack of Transportation, Financial Difficulties, or Caregiver Stress   Inactivity/Sedentary  Obesity/Overweight   The above risks/problems have been discussed with the patient.  Follow up actions/plans if indicated are seen below in the Assessment/Plan Section.  Pertinent information has been shared with the patient in the After Visit Summary.    Diagnoses and all orders for this visit:    1. Medicare annual wellness visit, subsequent (Primary)    Here for annual exam, fasting labs up-to-date with her endocrinologist, reviewed recent labs, immunizations up-to-date, colon cancer screening negative for symptoms, GYN health up-to-date, cardiovascular screening negative for symptoms, counseled patient to increase vegetable intake, water and 150 minutes of exercise weekly combining weightbearing exercises and aerobic activity.    We discussed aspirin use with the mild thrombocytosis.  I am hopeful as well that stopping the Jardiance will improve her hematocrit.  If it does come down to normal limits I do think it would be acceptable for her to  discontinue the baby aspirin usage.  She is nervous to discontinue this.  If she would prefer to stay on the aspirin I do not think it would be harmful as long as she is not having symptoms of bleeding.  If her hematocrit does not improve it may be worth seeing hematology again but will wait for upcoming labs before making further recommendations    Also, she is in very good hands, seeing her endocrinologist regularly.  Therefore okay to see me annually unless she is needing to see me more often.    Follow Up:   Return in about 1 year (around 5/23/2023), or if symptoms worsen or fail to improve, for Annual Exam with fasting labs prior.     An After Visit Summary and PPPS were made available to the patient.  Medical assistant and I wore mask and eyewear protection during entire encounter.  Patient wore mask.    Caroline Gomes MD

## 2022-06-08 NOTE — PROGRESS NOTES
Chief Complaint  Diabetes (Type 2: )    Interval history is negative for any new symptoms, ER visits or hospitalizations.  She presents for follow-up for type 2 diabetes.    She noted her BS and weight have gone up since she stopped the Jardiance.  Her perineal rash has resolved and her Hb/Hct has improved.     Diet; moderate restriction of carbs  Exercise; daily walks and games  Medications;  Metformin and januvia   - She has a strong family history of three immediate family members with pancreatitis.  - In the past she gained 40 lb on Actos    Blood sugar readings;  - BS log shows readings moderately at or above target range.   - No hypoglycemia.  Labs; as below.   Latest Reference Range & Units 07/22/21 08:21 11/15/21 08:09 04/13/22 11:36   Hemoglobin A1C 4.8 - 5.6 % 7.7 7.5 (H) [1] 7.9 (H) [2]      Latest Reference Range & Units 11/15/21 08:09 04/13/22 11:36   Alkaline Phosphatase 44 - 121 IU/L 100 [1] 125 (H)   Total Protein 6.0 - 8.5 g/dL 6.7 7.1   ALT (SGPT) 0 - 32 IU/L 23 36 (H)   AST (SGOT) 0 - 40 IU/L 14 22      Latest Reference Range & Units 11/15/21 08:09 04/13/22 11:36   Hemoglobin 11.1 - 15.9 g/dL 16.3 (H) 16.3 (H)   Hematocrit 34.0 - 46.6 % 47.4 (H) 49.1 (H)     Rest of labs as below.    Subjective          Lou Kimball presents to [facility]  History of Present Illness      Diabetes (type 2) and Hyperlipidemia    Subjective          Lou Kimball presents to [facility]  History of Present Illness    Patient presents for evaluation and treatment recommendations for diabetes.  Diagnosed: 15 years ago.    The patient moved to this area in 2017.  Her diabetes care was transitioned to Dr. Gomes in 2018.  Dr. Gomes made some changes in her medications for diabetes and the patient reports that the blood sugars since that time have been higher than before.      Previously the patient was on Metformin 1000 mg daily and Actos.  She reports  "her hemoglobin A1c ranged from 5 -6%.  Her Actos was discontinued and Metformin dose was increased.    Following discontinuation of Actos, the patient lost about 40 pounds, ie 210 to 175 lb.      DIET: ketodiet    EXERCISE: walks 3-5 miles a day (10-12 lb daily when in [place])    WEIGHT: Stable.  Patient monitors her weight on her home scale.  This morning she was 175.6 lb.    DM MEDICATION REGIMEN:  Off actos  januvia 100 milligrams daily.  jardiance 10 milligrams daily.  Metformin 2000 mg daily in divided doses.    Compliance: good    Statin therapy: Atorvastatin (changed from previous simvastatin).    BP regimen: none    Patient usually does not check her fingerstick glucose.  She has checked her glucose readings over the past 1 week for this appointment.    -145 mg/dL  Before lunch 120 mg/dL  Before dinner NA  Bed time 121 mg/dL    Hypoglycemia awareness: yes  No rececnt episodes.  Episodes of severe hypoglycemia: denies  Hospitalization with [facility]/ DKA: denies    Macrovascular complications:  - Cardiovascular disease: denies  - Cerebrovascular disease: denies  - Peripheral artrial disease: denies    Microvascular complications:  - Diabetic retinopathy: denies  Last comprehensive diabetic eye exam 4/2021  - Diabetic nephropathy: denies  - Diabetic neuropathy: denies    Results for JOSHUA PUENTES (MRN [id number]) as of 12/20/2021 08:16   Ref. Range 7/22/2021 08:21 11/15/2021 08:09   Hemoglobin A1C Latest Ref Range: 4.8 - 5.6 % 7.7 7.5 (H)       Review of Systems is as per history of presenting illness otherwise negative.        Objective   Vital Signs:   /75   Pulse 88   Temp 96.7 °F (35.9 °C) (Temporal)   Ht 170.2 cm (67\")   Wt 81.2 kg (179 lb) Comment: Pt refused scale stated she weight herself at home this am  SpO2 98%   BMI 28.04 kg/m²     Physical Exam       General: She is not in acute distress.     Appearance: She is not ill-appearing, toxic-appearing or diaphoretic.   HENT:      Head: " Normocephalic and atraumatic.      Nose: Nose normal.      Mouth/Throat:      Mouth: Mucous membranes are moist.      Pharynx: Oropharynx is clear. No oropharyngeal exudate or posterior oropharyngeal erythema.   Eyes:      General: No scleral icterus.     Extraocular Movements: Extraocular movements intact.      Conjunctiva/sclera: Conjunctivae normal.      Pupils: Pupils are equal, round, and reactive to light.   Neck:      Thyroid: No thyroid mass, thyromegaly or thyroid tenderness.      Vascular: No carotid bruit.      Trachea: Trachea normal.   Cardiovascular:      Rate and Rhythm: Normal rate and regular rhythm.      Pulses: Normal pulses.      Heart sounds: Normal heart sounds. No murmur heard.  No friction rub. No gallop.    Pulmonary:      Effort: Pulmonary effort is normal. No respiratory distress.      Breath sounds: Normal breath sounds. No stridor. No wheezing, rhonchi or rales.   Chest:      Chest wall: No tenderness.   Abdominal:      General: Bowel sounds are normal. There is no distension.      Palpations: Abdomen is soft. There is no mass.      Tenderness: There is no abdominal tenderness. There is no rebound.   Musculoskeletal:         General: No swelling, tenderness, deformity or signs of injury. Normal range of motion.      Cervical back: Normal range of motion and neck supple. No rigidity or tenderness.      Right lower leg: No edema.      Left lower leg: No edema.   Lymphadenopathy:      Cervical: No cervical adenopathy.   Skin:     General: Skin is warm and dry.      Capillary Refill: Capillary refill takes 2 to 3 seconds.      Coloration: Skin is not jaundiced or pale.      Findings: No bruising, erythema, lesion or rash.   Neurological:      General: No focal deficit present.      Mental Status: She is alert and oriented to person, place, and time. Mental status is at baseline.      Cranial Nerves: No cranial nerve deficit.      Sensory: No sensory deficit.      Motor: No weakness.       Coordination: Coordination normal.      Gait: Gait normal.      Deep Tendon Reflexes: Reflexes normal.   Psychiatric:         Mood and Affect: Mood normal.         Behavior: Behavior normal.         Thought Content: Thought content normal.         Judgment: Judgment normal.        Diabetic foot exam:   Left: Filament test present   Pulses Dorsalis Pedis:  present   Reflexes 2+    Vibratory sensation normal   Proprioception normal   Sharp/dull discrimination normal     Right: Filament test present   Pulses Dorsalis Pedis:  present   Reflexes 2+    Vibratory sensation normal   Proprioception normal   Sharp/dull discrimination normal     Result Review :     CMP    CMP 11/15/21 4/13/22 5/26/22   Glucose 130 (A) 128 (A) 153 (A)   BUN 13 14 9   Creatinine 0.88 0.81 0.78   eGFR Non  Am 68     eGFR African Am 78     Sodium 140 141 143   Potassium 4.8 4.9 4.4   Chloride 103 100 103   Calcium 10.0 10.2 9.7   Total Protein 6.7 7.1 6.8   Albumin 4.5 4.8 4.3   Globulin 2.2 2.3 2.5   Total Bilirubin 0.6 0.7 0.6   Alkaline Phosphatase 100 125 (A) 120   AST (SGOT) 14 22 23   ALT (SGPT) 23 36 (A) 37 (A)   (A) Abnormal value       Comments are available for some flowsheets but are not being displayed.           Lipid Panel    Lipid Panel 11/15/21 4/13/22   Total Cholesterol 167 155   Triglycerides 170 (A) 171 (A)   HDL Cholesterol 49 51   VLDL Cholesterol 29 29   LDL Cholesterol  89 75   (A) Abnormal value                Most Recent A1C    HGBA1C Most Recent 4/13/22   Hemoglobin A1C 7.9 (A)   (A) Abnormal value       Comments are available for some flowsheets but are not being displayed.               A1C Last 3 Results    HGBA1C Last 3 Results 7/22/21 11/15/21 4/13/22   Hemoglobin A1C 7.7 7.5 (A) 7.9 (A)   (A) Abnormal value       Comments are available for some flowsheets but are not being displayed.           Microalbumin    Microalbumin 11/15/21 4/13/22   Microalbumin, Urine 3.7 <3.0      Comments are available for some  flowsheets but are not being displayed.                     Assessment and Plan    Diagnoses and all orders for this visit:    1. Type 2 diabetes mellitus without complication, without long-term current use of insulin (HCC) (Primary)  -     Hemoglobin A1c; Future  -     Comprehensive Metabolic Panel; Future  -     Lipid Panel; Future  -     Microalbumin / Creatinine Urine Ratio - Urine, Clean Catch; Future  -     Fructosamine; Future  -     CBC Auto Differential; Future  -     Semaglutide (Rybelsus) 3 MG tablet; Take 1 tablet by mouth Daily.  Dispense: 30 tablet; Refill: 2    2. Dyslipidemia  -     Comprehensive Metabolic Panel; Future  -     Lipid Panel; Future  -     TSH; Future    3. Elevated BP without diagnosis of hypertension  -     Comprehensive Metabolic Panel; Future  -     Microalbumin / Creatinine Urine Ratio - Urine, Clean Catch; Future    4. Abnormal LFTs  -     Comprehensive Metabolic Panel; Future    5. Polycythemia  -     CBC Auto Differential; Future    6. Vitamin D deficiency  -     Vitamin D 25 Hydroxy; Future    7. Other insomnia         Assessment and Plan    There are no diagnoses linked to this encounter.    I spent 35 minutes caring for Lou on this date of service. This time includes time spent by me in the following activities:reviewing tests, obtaining and/or reviewing a separately obtained history, performing a medically appropriate examination and/or evaluation , counseling and educating the patient/family/caregiver, ordering medications, tests, or procedures, referring and communicating with other health care professionals , documenting information in the medical record and independently interpreting results and communicating that information with the patient/family/caregiver  Follow Up   No follow-ups on file.  Patient was given instructions and counseling regarding her condition or for health maintenance advice. Please see specific information pulled into the AVS if appropriate.      TYPE 2 DM:  1. Check fingerstick glucose as directed. Bring glucose log to every visit.  2. Restrict carb intake. Consider IF at 8/16  3. Establish/maintain/intensify and exercise routine.    4. Medication: continue metformin and stop januvia.        Risk and benefits of GLP-1 RA reviewed.  There is a family history of pancreatitis.  The patient has never had pancreatitis.  She will very closely monitor for any symptoms consistent with pancreatitis, which were reviewed in detail at the clinic.  She will report to the emergency room immediately if any of the symptoms of pancreatitis occur.  We will start Rybelsus at 3 mg daily as directed and titrate up slowly to max tolerated dose.   5.  Diabetic foot care as directed  6. Follow up with ophthalmology per schedule for comprehensive diabetic eye exam   7. RTC with labs drawn one week before the follow up appointment  8. RTC in 3-4 months    We will intensify the lifestyle changes, with a weight loss goal of 15 pounds over the next 3 to 6 months.  With these changes, we anticipate her blood sugars to improve.  We will continue the same medications at current doses.    POLYCYTHEMIA:  Resolved off Jardiance    ABNORMAL LFTs:  ? Suspect fatty liver. Will intensify lifestyle changes and add a GLP-1 RA to her therapy and emphasize on weight loss.   Notify PCP to exclude alternate causes of abnormal LFTs.    INSOMNIA:  Scheduled to see sleep medicine.    HIGH BP:  WNL    HLD:   Continue lifestyle changes. Continue statin therapy.    Follow Up   Return in about 3 months (around 9/9/2022).  Patient was given instructions and counseling regarding her condition or for health maintenance advice. Please see specific information pulled into the AVS if appropriate.

## 2022-06-09 ENCOUNTER — OFFICE VISIT (OUTPATIENT)
Dept: ENDOCRINOLOGY | Age: 67
End: 2022-06-09

## 2022-06-09 VITALS
OXYGEN SATURATION: 98 % | SYSTOLIC BLOOD PRESSURE: 125 MMHG | TEMPERATURE: 96.7 F | HEIGHT: 67 IN | WEIGHT: 179 LBS | BODY MASS INDEX: 28.09 KG/M2 | HEART RATE: 88 BPM | DIASTOLIC BLOOD PRESSURE: 75 MMHG

## 2022-06-09 DIAGNOSIS — R79.89 ABNORMAL LFTS: ICD-10-CM

## 2022-06-09 DIAGNOSIS — E78.5 DYSLIPIDEMIA: ICD-10-CM

## 2022-06-09 DIAGNOSIS — E11.9 TYPE 2 DIABETES MELLITUS WITHOUT COMPLICATION, WITHOUT LONG-TERM CURRENT USE OF INSULIN: Primary | ICD-10-CM

## 2022-06-09 DIAGNOSIS — D75.1 POLYCYTHEMIA: ICD-10-CM

## 2022-06-09 DIAGNOSIS — E55.9 VITAMIN D DEFICIENCY: ICD-10-CM

## 2022-06-09 DIAGNOSIS — G47.09 OTHER INSOMNIA: ICD-10-CM

## 2022-06-09 DIAGNOSIS — R03.0 ELEVATED BP WITHOUT DIAGNOSIS OF HYPERTENSION: ICD-10-CM

## 2022-06-09 PROCEDURE — 99214 OFFICE O/P EST MOD 30 MIN: CPT | Performed by: INTERNAL MEDICINE

## 2022-06-09 RX ORDER — ORAL SEMAGLUTIDE 7 MG/1
7 TABLET ORAL DAILY
Qty: 30 TABLET | Refills: 2 | Status: SHIPPED | OUTPATIENT
Start: 2022-06-09 | End: 2022-09-02

## 2022-06-09 RX ORDER — ORAL SEMAGLUTIDE 14 MG/1
14 TABLET ORAL DAILY
Qty: 90 TABLET | Refills: 2 | Status: CANCELLED | OUTPATIENT
Start: 2022-06-09 | End: 2022-07-09

## 2022-06-09 RX ORDER — ORAL SEMAGLUTIDE 3 MG/1
3 TABLET ORAL DAILY
Qty: 30 TABLET | Refills: 2 | Status: SHIPPED | OUTPATIENT
Start: 2022-06-09 | End: 2022-09-02

## 2022-06-09 RX ORDER — ORAL SEMAGLUTIDE 14 MG/1
14 TABLET ORAL DAILY
Qty: 30 TABLET | Refills: 2 | Status: SHIPPED | OUTPATIENT
Start: 2022-06-09 | End: 2022-09-02

## 2022-06-17 ENCOUNTER — OFFICE VISIT (OUTPATIENT)
Dept: SLEEP MEDICINE | Facility: HOSPITAL | Age: 67
End: 2022-06-17

## 2022-06-17 VITALS
SYSTOLIC BLOOD PRESSURE: 120 MMHG | DIASTOLIC BLOOD PRESSURE: 74 MMHG | HEIGHT: 67 IN | OXYGEN SATURATION: 97 % | HEART RATE: 78 BPM | WEIGHT: 177 LBS | BODY MASS INDEX: 27.78 KG/M2

## 2022-06-17 DIAGNOSIS — G47.00 INSOMNIA, UNSPECIFIED TYPE: Primary | ICD-10-CM

## 2022-06-17 DIAGNOSIS — E66.3 OVERWEIGHT WITH BODY MASS INDEX (BMI) 25.0-29.9: ICD-10-CM

## 2022-06-17 DIAGNOSIS — G47.8 NON-RESTORATIVE SLEEP: ICD-10-CM

## 2022-06-17 DIAGNOSIS — G47.10 HYPERSOMNIA: ICD-10-CM

## 2022-06-17 PROCEDURE — 99204 OFFICE O/P NEW MOD 45 MIN: CPT | Performed by: FAMILY MEDICINE

## 2022-06-17 PROCEDURE — G0463 HOSPITAL OUTPT CLINIC VISIT: HCPCS

## 2022-06-17 NOTE — PROGRESS NOTES
[facility] New Patient/Consultation       Reason for Consultation: Insomnia      Patient Care Team:  Caroline Gomes MD as PCP - General (Family Medicine)  Caroline Gomes MD as Referring Physician (Family Medicine)  Eugene Vargas MD as Consulting Physician (Sleep Medicine)      History of present illness:  Thank you for asking me to see your patient.  The patient is a 66 y.o. female with type 2 diabetes mellitus GERD arthritis hyperlipidemia presents today with concern for sleep disorder.  No history of prior sleep study or tonsillectomy.  Patient reports waking up frequently throughout the night and really getting more than 5 to 6 hours of sleep.  Patient reports hypersomnia nonrestorative sleep weight loss over the past 5 years waking with dry mouth knee pain disrupting sleep sleep-related bruxism nocturia up to 1 time a night and difficulty staying asleep at night.  No family history of sleep apnea she is aware of.  Overweight BMI 27.7.    Bedtime 10 PM to 11 PM sleep latency 1 minute wake time 4 AM to 6 AM sleeps around 6 hours 1 nap a day no rotating shifts.      Social History: No tobacco alcohol or drug use 1 caffeinated beverage a day    Allergies:  Codeine, Dextromethorphan, Phenergan [promethazine hcl], and Tetracyclines & related    Family History: BK no       Current Outpatient Medications:   •  albuterol sulfate  (90 Base) MCG/ACT inhaler, Inhale 2 puffs Every 4 (Four) Hours As Needed for Wheezing., Disp: 1 inhaler, Rfl: 1  •  aspirin 81 MG chewable tablet, Chew 81 mg Daily., Disp: , Rfl:   •  atorvastatin (LIPITOR) 20 MG tablet, Take 1 tablet by mouth every night at bedtime., Disp: 90 tablet, Rfl: 1  •  Cholecalciferol (VITAMIN D3) 5000 units capsule capsule, Take 5,000 Units by mouth Daily., Disp: , Rfl:   •  estradiol (ESTRACE) 0.1 MG/GM vaginal cream, Insert 1 g into the vagina 2 (Two) Times a Week., Disp: 45 g, Rfl: 6  •  metFORMIN ER (GLUCOPHAGE-XR) 500 MG 24 hr tablet, TAKE 2  "TABLETS BY MOUTH IN THE MORNING AND AFTERNOON, Disp: 360 tablet, Rfl: 1  •  omeprazole (priLOSEC) 20 MG capsule, Take 20 mg by mouth Every Other Day. Pt alternates prilosec with pepcid, Disp: , Rfl:   •  Semaglutide (Rybelsus) 14 MG tablet, Take 1 tablet by mouth Daily., Disp: 30 tablet, Rfl: 2  •  Semaglutide (Rybelsus) 3 MG tablet, Take 1 tablet by mouth Daily., Disp: 30 tablet, Rfl: 2  •  Semaglutide (Rybelsus) 3 MG tablet, Take 1 tablet by mouth Daily., Disp: 30 tablet, Rfl: 2  •  Semaglutide (Rybelsus) 7 MG tablet, Take 7 mg by mouth Daily., Disp: 30 tablet, Rfl: 2    Vital Signs:    Vitals:    06/17/22 0700   BP: 120/74   Pulse: 78   SpO2: 97%   Weight: 80.3 kg (177 lb)   Height: 170.2 cm (67\")      Body mass index is 27.72 kg/m².  Neck Circumference: 13.75 inches      REVIEW OF SYSTEMS.  Full review of systems available on the intake form which is scanned in the media tab.  The relevant positive are noted below  1. Daytime excessive sleepiness with [place] Sleepiness Scale :Total score: 6   2. Snoring  3. Frequent heartburn      Physical exam:  Vitals:    06/17/22 0700   BP: 120/74   Pulse: 78   SpO2: 97%   Weight: 80.3 kg (177 lb)   Height: 170.2 cm (67\")    Body mass index is 27.72 kg/m². Neck Circumference: 13.75 inches  HEENT: Head is atraumatic, normocephalic  Eyes: pupils are round equal and reacting to light and accommodation, conjunctiva normal  NECK:Neck Circumference: 13.75 inches  RESPIRATORY SYSTEM: Regular respirations  CARDIOVASULAR SYSTEM: Regular rate  EXTREMITES: No cyanosis, clubbing  NEUROLOGICAL SYSTEM: Oriented x 3, no gross motor defects, gait normal      Impression:  1. Insomnia, unspecified type    2. Hypersomnia    3. Non-restorative sleep    4. Overweight with body mass index (BMI) 25.0-29.9        Plan:    Good sleep hygiene measures should be maintained.  Weight loss would be beneficial in this patient who is overweight BMI 27.7.    I discussed the pathophysiology of obstructive " sleep apnea with the patient.  We discussed the adverse outcomes associated with untreated sleep-disordered breathing.  We discussed treatment modalities of obstructive sleep apnea including CPAP device as well as oral mandibular advancement device. Sleep study will be scheduled to establish definitive diagnosis of sleep disorder breathing.  Weight loss will be strongly beneficial in order to reduce the severity of sleep-disordered breathing.  Patient has narrow oropharyngeal structure.  Caution during activities that require prolonged concentration is strongly advised.  Patient will be notified of sleep study results after sleep study is completed.  If sleep apnea is only mild,  oral mandibular advancement device may be one of the treatment options.  However if sleep apnea is moderately severe, CPAP treatment will be strongly encouraged.  The patient is not opposed to treatment with CPAP device if we confirm significant obstructive sleep apnea on polysomnography.     Thank you for allowing me to participate in your patient's care.    Eugene Vargas MD  Sleep Medicine  06/17/22  08:27 EDT

## 2022-07-11 ENCOUNTER — HOSPITAL ENCOUNTER (OUTPATIENT)
Dept: SLEEP MEDICINE | Facility: HOSPITAL | Age: 67
Discharge: HOME | End: 2022-07-11
Admitting: FAMILY MEDICINE

## 2022-07-11 DIAGNOSIS — G47.10 HYPERSOMNIA: ICD-10-CM

## 2022-07-11 DIAGNOSIS — E66.3 OVERWEIGHT WITH BODY MASS INDEX (BMI) 25.0-29.9: ICD-10-CM

## 2022-07-11 DIAGNOSIS — G47.8 NON-RESTORATIVE SLEEP: ICD-10-CM

## 2022-07-11 DIAGNOSIS — G47.00 INSOMNIA, UNSPECIFIED TYPE: ICD-10-CM

## 2022-07-11 PROCEDURE — 95806 SLEEP STUDY UNATT&RESP EFFT: CPT

## 2022-07-11 PROCEDURE — 95806 SLEEP STUDY UNATT&RESP EFFT: CPT | Performed by: FAMILY MEDICINE

## 2022-07-18 ENCOUNTER — TELEPHONE (OUTPATIENT)
Dept: SLEEP MEDICINE | Facility: HOSPITAL | Age: 67
End: 2022-07-18

## 2022-07-29 ENCOUNTER — APPOINTMENT (OUTPATIENT)
Dept: SLEEP MEDICINE | Facility: HOSPITAL | Age: 67
End: 2022-07-29

## 2022-08-12 ENCOUNTER — OFFICE VISIT (OUTPATIENT)
Dept: SLEEP MEDICINE | Facility: HOSPITAL | Age: 67
End: 2022-08-12

## 2022-08-12 VITALS
WEIGHT: 175 LBS | SYSTOLIC BLOOD PRESSURE: 122 MMHG | HEIGHT: 67 IN | DIASTOLIC BLOOD PRESSURE: 77 MMHG | HEART RATE: 80 BPM | BODY MASS INDEX: 27.47 KG/M2 | OXYGEN SATURATION: 97 %

## 2022-08-12 DIAGNOSIS — F51.12 INSUFFICIENT SLEEP SYNDROME: ICD-10-CM

## 2022-08-12 DIAGNOSIS — E66.3 OVERWEIGHT WITH BODY MASS INDEX (BMI) 25.0-29.9: ICD-10-CM

## 2022-08-12 DIAGNOSIS — R00.1 BRADYCARDIA: Primary | ICD-10-CM

## 2022-08-12 PROCEDURE — G0463 HOSPITAL OUTPT CLINIC VISIT: HCPCS

## 2022-08-12 PROCEDURE — 99214 OFFICE O/P EST MOD 30 MIN: CPT | Performed by: FAMILY MEDICINE

## 2022-08-12 NOTE — PROGRESS NOTES
Follow Up [facility] Note     Chief Complaint:  BK     Primary Care Physician: Caroline Gomes MD    Lou Kimball is a 67 y.o.female  was last seen at [facility] sleep lab: 7/11/2022 for home sleep study.  Patient presents today to discuss results.  Patient had reported waking up frequently throughout the night resulting in hypersomnia nonrestorative sleep.  HST showed overall AHI 5.1 lowest SPO2 86% average SPO2 92%.  Bradycardia as low as 25 bpm on night of sleep study.  Patient has noted since last visit she sleeps more hours she does not have any hypersomnia or nonrestorative sleep during the day.  On nights where she sleeps less hours she does feel more tired and needs to nap during the day.      Current Medications:    Current Outpatient Medications:   •  albuterol sulfate  (90 Base) MCG/ACT inhaler, Inhale 2 puffs Every 4 (Four) Hours As Needed for Wheezing., Disp: 1 inhaler, Rfl: 1  •  aspirin 81 MG chewable tablet, Chew 81 mg Daily., Disp: , Rfl:   •  atorvastatin (LIPITOR) 20 MG tablet, Take 1 tablet by mouth every night at bedtime., Disp: 90 tablet, Rfl: 1  •  Cholecalciferol (VITAMIN D3) 5000 units capsule capsule, Take 5,000 Units by mouth Daily., Disp: , Rfl:   •  estradiol (ESTRACE) 0.1 MG/GM vaginal cream, Insert 1 g into the vagina 2 (Two) Times a Week., Disp: 45 g, Rfl: 6  •  metFORMIN ER (GLUCOPHAGE-XR) 500 MG 24 hr tablet, TAKE 2 TABLETS BY MOUTH IN THE MORNING AND AFTERNOON, Disp: 360 tablet, Rfl: 1  •  omeprazole (priLOSEC) 20 MG capsule, Take 20 mg by mouth Every Other Day. Pt alternates prilosec with pepcid, Disp: , Rfl:   •  Semaglutide (Rybelsus) 14 MG tablet, Take 1 tablet by mouth Daily., Disp: 30 tablet, Rfl: 2  •  Semaglutide (Rybelsus) 3 MG tablet, Take 1 tablet by mouth Daily., Disp: 30 tablet, Rfl: 2  •  Semaglutide (Rybelsus) 3 MG tablet, Take 1 tablet by mouth Daily., Disp: 30 tablet, Rfl: 2  •  Semaglutide (Rybelsus) 7 MG tablet, Take 7 mg by mouth Daily., Disp: 30  "tablet, Rfl: 2   also entered in Sleep Questionnaire    Patient  has a past medical history of Asthma, Bronchitis (03/20/2020), Diabetes mellitus (HCC), Fibroid, GERD (gastroesophageal reflux disease), and Hyperlipidemia.    Social History:    Social History     Socioeconomic History   • Marital status:    Tobacco Use   • Smoking status: Never Smoker   • Smokeless tobacco: Never Used   Vaping Use   • Vaping Use: Never used   Substance and Sexual Activity   • Alcohol use: No   • Drug use: No   • Sexual activity: Yes     Partners: Male     Birth control/protection: Post-menopausal, Surgical     Comment:  , TVH wth OC       Allergies:  Codeine, Dextromethorphan, Phenergan [promethazine hcl], and Tetracyclines & related    Vital Signs:    Vitals:    08/12/22 1100   BP: 122/77   Pulse: 80   SpO2: 97%   Weight: 79.4 kg (175 lb)   Height: 170.2 cm (67\")     Body mass index is 27.41 kg/m².    REVIEW OF SYSTEMS.  Full review of systems available on the intake form which is scanned in the media tab.  The relevant positive are noted below  1. Daytime excessive sleepiness with [place] Sleepiness Scale :Total score: 6   2. Snoring  3. Postnasal drip  4. Heartburn      Physical exam:  Vitals:    08/12/22 1100   BP: 122/77   Pulse: 80   SpO2: 97%   Weight: 79.4 kg (175 lb)   Height: 170.2 cm (67\")    Body mass index is 27.41 kg/m².    HEENT: Head is atraumatic, normocephalic  Eyes: pupils are round equal and reacting to light and accommodation, conjunctiva normal  RESPIRATORY SYSTEM: Regular respirations  CARDIOVASULAR SYSTEM: Regular rate  EXTREMITES: No cyanosis, clubbing  NEUROLOGICAL SYSTEM: Oriented x 3, no gross motor defects, gait normal    Impression:  1. Bradycardia    2. Overweight with body mass index (BMI) 25.0-29.9    3. Insufficient sleep syndrome        Recommend increasing hours of sleep consistently.  Refer to cardiology for work-up of bradycardia noted during sleep.  No significant sleep-related " hypoxia noted therefore no need for CPAP machine.    Weight loss will be strongly beneficial to reduce the severity of sleep-disordered breathing.  Caution during activities that require prolonged concentration is strongly advised if sleepiness returns.     Return to clinic as needed.    Eugene Vargas MD  Sleep Medicine  08/12/22  11:30 EDT

## 2022-08-16 ENCOUNTER — TELEPHONE (OUTPATIENT)
Dept: FAMILY MEDICINE CLINIC | Facility: CLINIC | Age: 67
End: 2022-08-16

## 2022-08-16 DIAGNOSIS — Z12.11 ENCOUNTER FOR SCREENING COLONOSCOPY: Primary | ICD-10-CM

## 2022-08-16 NOTE — TELEPHONE ENCOUNTER
Caller: Lou Kimball    Relationship: Self    Best call back number: [phone]    What is the best time to reach you: ANY    Who are you requesting to speak with (clinical staff, provider,  specific staff member): CLINICAL    Do you know the name of the person who called:UNKNOWN    What was the call regarding: PATIENT REQUEST A NEW REFERRAL TO OBTAIN A COLONOSCOPY.      Do you require a callback: YES

## 2022-08-22 ENCOUNTER — TELEPHONE (OUTPATIENT)
Dept: ENDOCRINOLOGY | Age: 67
End: 2022-08-22

## 2022-08-22 DIAGNOSIS — E11.9 TYPE 2 DIABETES MELLITUS WITHOUT COMPLICATION, WITHOUT LONG-TERM CURRENT USE OF INSULIN: ICD-10-CM

## 2022-08-22 RX ORDER — METFORMIN HYDROCHLORIDE 500 MG/1
TABLET, EXTENDED RELEASE ORAL
Qty: 360 TABLET | Refills: 1 | OUTPATIENT
Start: 2022-08-22

## 2022-08-22 NOTE — TELEPHONE ENCOUNTER
This medication is managed by endocrinology, please advise patient to call endocrinology for refill.

## 2022-08-22 NOTE — TELEPHONE ENCOUNTER
PT CALLED WANTING TO LET HER DOCTOR KNOW SHE STOPPED TAKING HER NEW MEDICATION LIBETHRIDE BECAUSE IT WAS MAKING HER SICK.

## 2022-08-22 NOTE — TELEPHONE ENCOUNTER
Rx Refill Note  Requested Prescriptions      No prescriptions requested or ordered in this encounter      Last office visit with prescribing clinician: 5/23/2022      Next office visit with prescribing clinician: 5/25/2023            Sun Edwards MA  08/22/22, 09:28 EDT

## 2022-08-24 DIAGNOSIS — E11.9 TYPE 2 DIABETES MELLITUS WITHOUT COMPLICATION, WITHOUT LONG-TERM CURRENT USE OF INSULIN: ICD-10-CM

## 2022-08-24 RX ORDER — METFORMIN HYDROCHLORIDE 500 MG/1
TABLET, EXTENDED RELEASE ORAL
Qty: 360 TABLET | Refills: 1 | Status: SHIPPED | OUTPATIENT
Start: 2022-08-24 | End: 2023-02-23 | Stop reason: SDUPTHER

## 2022-08-26 ENCOUNTER — LAB (OUTPATIENT)
Dept: ENDOCRINOLOGY | Age: 67
End: 2022-08-26

## 2022-08-26 DIAGNOSIS — D75.1 POLYCYTHEMIA: ICD-10-CM

## 2022-08-26 DIAGNOSIS — E55.9 VITAMIN D DEFICIENCY: ICD-10-CM

## 2022-08-26 DIAGNOSIS — E11.9 TYPE 2 DIABETES MELLITUS WITHOUT COMPLICATION, WITHOUT LONG-TERM CURRENT USE OF INSULIN: ICD-10-CM

## 2022-08-26 DIAGNOSIS — R03.0 ELEVATED BP WITHOUT DIAGNOSIS OF HYPERTENSION: ICD-10-CM

## 2022-08-26 DIAGNOSIS — E78.5 DYSLIPIDEMIA: ICD-10-CM

## 2022-08-26 DIAGNOSIS — R79.89 ABNORMAL LFTS: ICD-10-CM

## 2022-08-27 LAB
25(OH)D3+25(OH)D2 SERPL-MCNC: 32.9 NG/ML (ref 30–100)
ALBUMIN SERPL-MCNC: 4.6 G/DL (ref 3.8–4.8)
ALBUMIN/CREAT UR: <6 MG/G CREAT (ref 0–29)
ALBUMIN/GLOB SERPL: 2.3 {RATIO} (ref 1.2–2.2)
ALP SERPL-CCNC: 97 IU/L (ref 44–121)
ALT SERPL-CCNC: 35 IU/L (ref 0–32)
AST SERPL-CCNC: 19 IU/L (ref 0–40)
BASOPHILS # BLD AUTO: 0.1 X10E3/UL (ref 0–0.2)
BASOPHILS NFR BLD AUTO: 1 %
BILIRUB SERPL-MCNC: 0.6 MG/DL (ref 0–1.2)
BUN SERPL-MCNC: 8 MG/DL (ref 8–27)
BUN/CREAT SERPL: 11 (ref 12–28)
CALCIUM SERPL-MCNC: 9.7 MG/DL (ref 8.7–10.3)
CHLORIDE SERPL-SCNC: 101 MMOL/L (ref 96–106)
CHOLEST SERPL-MCNC: 149 MG/DL (ref 100–199)
CO2 SERPL-SCNC: 23 MMOL/L (ref 20–29)
CREAT SERPL-MCNC: 0.74 MG/DL (ref 0.57–1)
CREAT UR-MCNC: 48.3 MG/DL
EGFRCR-CYS SERPLBLD CKD-EPI 2021: 88 ML/MIN/1.73
EOSINOPHIL # BLD AUTO: 0.2 X10E3/UL (ref 0–0.4)
EOSINOPHIL NFR BLD AUTO: 3 %
ERYTHROCYTE [DISTWIDTH] IN BLOOD BY AUTOMATED COUNT: 13.4 % (ref 11.7–15.4)
FRUCTOSAMINE SERPL-SCNC: 249 UMOL/L (ref 0–285)
GLOBULIN SER CALC-MCNC: 2 G/DL (ref 1.5–4.5)
GLUCOSE SERPL-MCNC: 162 MG/DL (ref 65–99)
HBA1C MFR BLD: 8 % (ref 4.8–5.6)
HCT VFR BLD AUTO: 43.5 % (ref 34–46.6)
HDLC SERPL-MCNC: 52 MG/DL
HGB BLD-MCNC: 14.7 G/DL (ref 11.1–15.9)
IMM GRANULOCYTES # BLD AUTO: 0.1 X10E3/UL (ref 0–0.1)
IMM GRANULOCYTES NFR BLD AUTO: 1 %
IMP & REVIEW OF LAB RESULTS: NORMAL
LDLC SERPL CALC-MCNC: 68 MG/DL (ref 0–99)
LYMPHOCYTES # BLD AUTO: 2.2 X10E3/UL (ref 0.7–3.1)
LYMPHOCYTES NFR BLD AUTO: 26 %
MCH RBC QN AUTO: 29.5 PG (ref 26.6–33)
MCHC RBC AUTO-ENTMCNC: 33.8 G/DL (ref 31.5–35.7)
MCV RBC AUTO: 87 FL (ref 79–97)
MICROALBUMIN UR-MCNC: <3 UG/ML
MONOCYTES # BLD AUTO: 0.6 X10E3/UL (ref 0.1–0.9)
MONOCYTES NFR BLD AUTO: 7 %
NEUTROPHILS # BLD AUTO: 5.5 X10E3/UL (ref 1.4–7)
NEUTROPHILS NFR BLD AUTO: 62 %
PLATELET # BLD AUTO: 321 X10E3/UL (ref 150–450)
POTASSIUM SERPL-SCNC: 4.7 MMOL/L (ref 3.5–5.2)
PROT SERPL-MCNC: 6.6 G/DL (ref 6–8.5)
RBC # BLD AUTO: 4.98 X10E6/UL (ref 3.77–5.28)
SODIUM SERPL-SCNC: 141 MMOL/L (ref 134–144)
TRIGL SERPL-MCNC: 175 MG/DL (ref 0–149)
TSH SERPL DL<=0.005 MIU/L-ACNC: 1.32 UIU/ML (ref 0.45–4.5)
VLDLC SERPL CALC-MCNC: 29 MG/DL (ref 5–40)
WBC # BLD AUTO: 8.6 X10E3/UL (ref 3.4–10.8)

## 2022-09-02 ENCOUNTER — OFFICE VISIT (OUTPATIENT)
Dept: ENDOCRINOLOGY | Age: 67
End: 2022-09-02

## 2022-09-02 VITALS
SYSTOLIC BLOOD PRESSURE: 122 MMHG | HEIGHT: 67 IN | HEART RATE: 76 BPM | WEIGHT: 173.5 LBS | TEMPERATURE: 97.2 F | DIASTOLIC BLOOD PRESSURE: 70 MMHG | OXYGEN SATURATION: 100 % | BODY MASS INDEX: 27.23 KG/M2

## 2022-09-02 DIAGNOSIS — E55.9 VITAMIN D DEFICIENCY: ICD-10-CM

## 2022-09-02 DIAGNOSIS — E78.5 DYSLIPIDEMIA: ICD-10-CM

## 2022-09-02 DIAGNOSIS — E11.8 TYPE 2 DIABETES MELLITUS WITH COMPLICATION, WITHOUT LONG-TERM CURRENT USE OF INSULIN: Primary | ICD-10-CM

## 2022-09-02 DIAGNOSIS — R79.89 ABNORMAL LFTS: ICD-10-CM

## 2022-09-02 PROCEDURE — 99214 OFFICE O/P EST MOD 30 MIN: CPT | Performed by: INTERNAL MEDICINE

## 2022-09-02 RX ORDER — PEN NEEDLE, DIABETIC 32 GX 1/4"
NEEDLE, DISPOSABLE MISCELLANEOUS
Qty: 90 EACH | Refills: 6 | Status: SHIPPED | OUTPATIENT
Start: 2022-09-02

## 2022-09-06 ENCOUNTER — TELEPHONE (OUTPATIENT)
Dept: FAMILY MEDICINE CLINIC | Facility: CLINIC | Age: 67
End: 2022-09-06

## 2022-09-06 ENCOUNTER — OFFICE VISIT (OUTPATIENT)
Dept: CARDIOLOGY | Facility: CLINIC | Age: 67
End: 2022-09-06

## 2022-09-06 VITALS
WEIGHT: 174 LBS | HEART RATE: 79 BPM | BODY MASS INDEX: 27.31 KG/M2 | HEIGHT: 67 IN | DIASTOLIC BLOOD PRESSURE: 80 MMHG | SYSTOLIC BLOOD PRESSURE: 120 MMHG

## 2022-09-06 DIAGNOSIS — R00.1 BRADYCARDIA, SINUS: Primary | ICD-10-CM

## 2022-09-06 PROCEDURE — 99203 OFFICE O/P NEW LOW 30 MIN: CPT | Performed by: INTERNAL MEDICINE

## 2022-09-06 PROCEDURE — 93000 ELECTROCARDIOGRAM COMPLETE: CPT | Performed by: INTERNAL MEDICINE

## 2022-09-06 NOTE — TELEPHONE ENCOUNTER
PATIENT CALLED STATING THAT IT HAS BEEN 2 1/2 WEEKS, AND SHE HAS STILL NOT BEEN CONTACTED TO SCHEDULE HER COLONOSCOPY.    PLEASE ADVISE  [phone]

## 2022-09-06 NOTE — PROGRESS NOTES
[facility]      Patient Name: Lou Kimball  :1955  Age: 67 y.o.  Encounter Provider:  Jaron Benavides Jr, MD      Chief Complaint: Initial evaluation of bradycardia      HPI  Lou Kimball is a 67 y.o. female with past medical history of diabetes and dyslipidemia presents for initial evaluation.  Patient was sent for home sleep study under suspicion for obstructive sleep apnea.  This showed a minimally elevated AHI at 5.1 events per hour with bradycardia during sleeping hours.  Patient denies palpitations, dizziness syncope.  No angina.  No orthopnea, PND or edema.  She walks 3 to 5 miles daily and plays pickle ball 2 times per week.  She spends half the year in [place] and plays pickle ball daily while in [place].  No limiting symptoms during exercise.  She believes her father may have had a heart attack at age 68 but  of Hodgkin's lymphoma at age 73.  No history of stents or bypass.  She is a lifelong non-smoker who denies alcohol or illicit drug use.      The following portions of the patient's history were reviewed and updated as appropriate: allergies, current medications, past family history, past medical history, past social history, past surgical history and problem list.      Review of Systems   Constitutional: Negative for chills and fever.   HENT: Negative for hoarse voice and sore throat.    Eyes: Negative for double vision and photophobia.   Cardiovascular: Negative for chest pain, leg swelling, near-syncope, orthopnea, palpitations, paroxysmal nocturnal dyspnea and syncope.   Respiratory: Negative for cough and wheezing.    Skin: Negative for poor wound healing and rash.   Musculoskeletal: Negative for arthritis and joint swelling.   Gastrointestinal: Negative for bloating, abdominal pain, hematemesis and hematochezia.   Neurological: Negative for dizziness and focal weakness.   Psychiatric/Behavioral: Negative for depression and suicidal ideas.       OBJECTIVE:  "  Vital Signs  Vitals:    09/06/22 0939   BP: 120/80   Pulse: 79     Estimated body mass index is 27.25 kg/m² as calculated from the following:    Height as of this encounter: 170.2 cm (67\").    Weight as of this encounter: 78.9 kg (174 lb).    Vitals reviewed.   Constitutional:       Appearance: Healthy appearance. Not in distress.   Neck:      Vascular: No JVR. JVD normal.   Pulmonary:      Effort: Pulmonary effort is normal.      Breath sounds: Normal breath sounds. No wheezing. No rhonchi. No rales.   Chest:      Chest wall: Not tender to palpatation.   Cardiovascular:      PMI at left midclavicular line. Normal rate. Regular rhythm. Normal S1. Normal S2.      Murmurs: There is no murmur.      No gallop. No click. No rub.   Pulses:     Intact distal pulses.   Edema:     Peripheral edema absent.   Abdominal:      General: Bowel sounds are normal.      Palpations: Abdomen is soft.      Tenderness: There is no abdominal tenderness.   Musculoskeletal: Normal range of motion.         General: No tenderness. Skin:     General: Skin is warm and dry.   Neurological:      General: No focal deficit present.      Mental Status: Alert and oriented to person, place and time.           ECG 12 Lead    Date/Time: 9/6/2022 11:19 AM  Performed by: Jaron Benavides Jr., MD  Authorized by: Jaron Benavides Jr., MD   Comparison: not compared with previous ECG   Previous ECG: no previous ECG available  Rhythm: sinus rhythm  Other findings: non-specific ST-T wave changes    Clinical impression: non-specific ECG                  ASSESSMENT:     Bradycardia  Diabetes  Dyslipidemia    PLAN OF CARE:     1. Bradycardia -reported low heart rate during sleeping hours.  Patient seems asymptomatic.  We will check a Holter monitor to ensure that there is no significant pauses.  2. Diabetes  3. Dyslipidemia    Return to clinic 6 months           Discharge Medications          Accurate as of September 6, 2022  9:46 AM. If you have any questions, ask " your nurse or doctor.            Continue These Medications      Instructions Start Date   aspirin 81 MG chewable tablet   81 mg, Oral, Daily      atorvastatin 20 MG tablet  Commonly known as: LIPITOR   20 mg, Oral, Every Night at Bedtime      BD Pen Needle Micro U/F 32G X 6 MM misc  Generic drug: Insulin Pen Needle   Use 1 needle daily      estradiol 0.1 MG/GM vaginal cream  Commonly known as: ESTRACE   1 g, Vaginal, 2 Times Weekly      Insulin Glargine 100 UNIT/ML injection pen  Commonly known as: LANTUS SOLOSTAR   Take 6 units s/c inj daily. Titrate every 3-4 days to FBS  mg/dL. Max: 30 units daily.      metFORMIN  MG 24 hr tablet  Commonly known as: GLUCOPHAGE-XR   TAKE 2 TABLETS BY MOUTH IN THE MORNING AND AFTERNOON      omeprazole 20 MG capsule  Commonly known as: priLOSEC   20 mg, Oral, Every Other Day, Pt alternates prilosec with pepcid             Thank you for allowing me to participate in the care of your patient,      Sincerely,   Jaron Benavides MD  [facility]  09/06/22  09:46 EDT

## 2022-09-09 ENCOUNTER — TELEPHONE (OUTPATIENT)
Dept: ENDOCRINOLOGY | Age: 67
End: 2022-09-09

## 2022-09-09 NOTE — TELEPHONE ENCOUNTER
Lantus was ordered at the last visit.  Pt needs to find out what other basal insulin her insurance prefers.

## 2022-09-09 NOTE — TELEPHONE ENCOUNTER
PT CALLED IN AND SAID THAT SHE GOT A PRESCRIPTION FOR INSULIN ON 9/2 AND THE PHARMACY HAD SAID THEY SENT US MESSAGE REGARDING A SUBSTITUTION FOR THAT INSULIN. SHE IS ASKING FOR US TO SEND A SUBSTITUTION. SHE IS NOT SURE OF WHY THE PHARMACY WANTS SOMETHING DIFFERENT. SHE DOES NOT HAVE ANY MEDICATION AND HAS BEEN UNABLE TO GET ANY SINCE IT WAS PRESCRIBED.

## 2022-09-12 ENCOUNTER — TELEPHONE (OUTPATIENT)
Dept: ENDOCRINOLOGY | Age: 67
End: 2022-09-12

## 2022-09-12 NOTE — TELEPHONE ENCOUNTER
9/12 Sw pt about Lantus. As far as basul insulin, told her to call her insurance to see what they prefer.

## 2022-09-13 DIAGNOSIS — E11.9 TYPE 2 DIABETES MELLITUS WITHOUT COMPLICATION, WITHOUT LONG-TERM CURRENT USE OF INSULIN: Primary | ICD-10-CM

## 2022-09-13 RX ORDER — INSULIN DETEMIR 100 [IU]/ML
INJECTION, SOLUTION SUBCUTANEOUS
Qty: 15 ML | Refills: 2 | Status: SHIPPED | OUTPATIENT
Start: 2022-09-13 | End: 2022-09-21 | Stop reason: ALTCHOICE

## 2022-09-21 RX ORDER — INSULIN GLARGINE 100 [IU]/ML
25 INJECTION, SOLUTION SUBCUTANEOUS DAILY
Qty: 23 ML | Refills: 1 | Status: SHIPPED | OUTPATIENT
Start: 2022-09-21 | End: 2022-12-20 | Stop reason: SDUPTHER

## 2022-09-22 ENCOUNTER — TELEPHONE (OUTPATIENT)
Dept: FAMILY MEDICINE CLINIC | Facility: CLINIC | Age: 67
End: 2022-09-22

## 2022-09-22 NOTE — TELEPHONE ENCOUNTER
Caller: Joshua Kimball    Relationship: Self    Best call back number: [phone] (H)    What is the best time to reach you: ANYTIME, ASAP    Who are you requesting to speak with (clinical staff, provider,  specific staff member): CLINICAL STAFF/ DR ENRIQUEZ'S ASSISTANT    Do you know the name of the person who called: JOSHUA KIMBALL    What was the call regarding: PATIENT HAS NOT HEARD FROM GASTROENTEROLOGY FOR A MONTH TO SCHEDULE HER COLONOSCOPY THAT SHE WAS REFERRED FOR, PLEASE CALL PATIENT BACK ASAP    Do you require a callback: YES, ASAP

## 2022-10-27 ENCOUNTER — PREP FOR SURGERY (OUTPATIENT)
Dept: SURGERY | Facility: SURGERY CENTER | Age: 67
End: 2022-10-27

## 2022-10-27 DIAGNOSIS — Z12.11 ENCOUNTER FOR SCREENING FOR MALIGNANT NEOPLASM OF COLON: Primary | ICD-10-CM

## 2022-10-31 RX ORDER — SODIUM CHLORIDE, SODIUM LACTATE, POTASSIUM CHLORIDE, CALCIUM CHLORIDE 600; 310; 30; 20 MG/100ML; MG/100ML; MG/100ML; MG/100ML
30 INJECTION, SOLUTION INTRAVENOUS CONTINUOUS PRN
Status: CANCELLED | OUTPATIENT
Start: 2022-10-31

## 2022-11-03 ENCOUNTER — DOCUMENTATION (OUTPATIENT)
Dept: GASTROENTEROLOGY | Facility: CLINIC | Age: 67
End: 2022-11-03

## 2022-11-08 PROBLEM — Z12.11 ENCOUNTER FOR SCREENING FOR MALIGNANT NEOPLASM OF COLON: Status: ACTIVE | Noted: 2022-11-08

## 2022-11-21 ENCOUNTER — HOSPITAL ENCOUNTER (OUTPATIENT)
Dept: MAMMOGRAPHY | Facility: HOSPITAL | Age: 67
Discharge: HOME | End: 2022-11-21

## 2022-11-21 ENCOUNTER — HOSPITAL ENCOUNTER (OUTPATIENT)
Dept: BONE DENSITY | Facility: HOSPITAL | Age: 67
Discharge: HOME | End: 2022-11-21

## 2022-11-21 DIAGNOSIS — Z13.9 SCREENING FOR CONDITION: ICD-10-CM

## 2022-11-21 DIAGNOSIS — Z12.31 ENCOUNTER FOR SCREENING MAMMOGRAM FOR MALIGNANT NEOPLASM OF BREAST: ICD-10-CM

## 2022-11-21 DIAGNOSIS — Z78.0 ASYMPTOMATIC MENOPAUSAL STATE: ICD-10-CM

## 2022-11-21 PROCEDURE — 77063 BREAST TOMOSYNTHESIS BI: CPT

## 2022-11-21 PROCEDURE — 77067 SCR MAMMO BI INCL CAD: CPT

## 2022-11-21 PROCEDURE — 77080 DXA BONE DENSITY AXIAL: CPT

## 2022-11-22 DIAGNOSIS — E11.8 TYPE 2 DIABETES MELLITUS WITH COMPLICATION, WITHOUT LONG-TERM CURRENT USE OF INSULIN: ICD-10-CM

## 2022-11-22 DIAGNOSIS — E78.5 DYSLIPIDEMIA: ICD-10-CM

## 2022-11-22 DIAGNOSIS — E55.9 VITAMIN D DEFICIENCY: ICD-10-CM

## 2022-11-22 DIAGNOSIS — R79.89 ABNORMAL LFTS: ICD-10-CM

## 2022-11-22 NOTE — PROGRESS NOTES
PIP+ PIP= DEXA shows osteopenia, however, she does not meet the criteria for treatment with osteoporosis medication. Rec daily calcium and vit D intake along with weight bearing exercises. Repeat DEXA in 2-3 years.

## 2022-11-23 LAB
25(OH)D3+25(OH)D2 SERPL-MCNC: 34.7 NG/ML (ref 30–100)
ALBUMIN SERPL-MCNC: 4.2 G/DL (ref 3.5–5.2)
ALBUMIN/GLOB SERPL: 1.8 G/DL
ALP SERPL-CCNC: 99 U/L (ref 39–117)
ALT SERPL-CCNC: 24 U/L (ref 1–33)
AST SERPL-CCNC: 20 U/L (ref 1–32)
BILIRUB SERPL-MCNC: 0.6 MG/DL (ref 0–1.2)
BUN SERPL-MCNC: 10 MG/DL (ref 8–23)
BUN/CREAT SERPL: 13.9 (ref 7–25)
CALCIUM SERPL-MCNC: 9.6 MG/DL (ref 8.6–10.5)
CHLORIDE SERPL-SCNC: 103 MMOL/L (ref 98–107)
CHOLEST SERPL-MCNC: 162 MG/DL (ref 0–200)
CO2 SERPL-SCNC: 25.8 MMOL/L (ref 22–29)
CREAT SERPL-MCNC: 0.72 MG/DL (ref 0.57–1)
EGFRCR SERPLBLD CKD-EPI 2021: 90.6 ML/MIN/1.73
GLOBULIN SER CALC-MCNC: 2.3 GM/DL
GLUCOSE SERPL-MCNC: 159 MG/DL (ref 65–99)
HBA1C MFR BLD: 8.4 % (ref 4.8–5.6)
HDLC SERPL-MCNC: 60 MG/DL (ref 40–60)
IMP & REVIEW OF LAB RESULTS: NORMAL
LDLC SERPL CALC-MCNC: 72 MG/DL (ref 0–100)
POTASSIUM SERPL-SCNC: 4.5 MMOL/L (ref 3.5–5.2)
PROT SERPL-MCNC: 6.5 G/DL (ref 6–8.5)
SODIUM SERPL-SCNC: 138 MMOL/L (ref 136–145)
TRIGL SERPL-MCNC: 180 MG/DL (ref 0–150)
TSH SERPL DL<=0.005 MIU/L-ACNC: 1.68 UIU/ML (ref 0.27–4.2)
UNABLE TO VOID: NORMAL
VLDLC SERPL CALC-MCNC: 30 MG/DL (ref 5–40)

## 2022-12-06 ENCOUNTER — OFFICE VISIT (OUTPATIENT)
Dept: ENDOCRINOLOGY | Age: 67
End: 2022-12-06

## 2022-12-06 VITALS
BODY MASS INDEX: 27.62 KG/M2 | WEIGHT: 176 LBS | HEIGHT: 67 IN | OXYGEN SATURATION: 98 % | HEART RATE: 82 BPM | DIASTOLIC BLOOD PRESSURE: 90 MMHG | SYSTOLIC BLOOD PRESSURE: 140 MMHG | TEMPERATURE: 96.9 F

## 2022-12-06 DIAGNOSIS — E11.8 TYPE 2 DIABETES MELLITUS WITH COMPLICATION, WITHOUT LONG-TERM CURRENT USE OF INSULIN: Primary | ICD-10-CM

## 2022-12-06 DIAGNOSIS — R79.89 ABNORMAL LFTS: ICD-10-CM

## 2022-12-06 DIAGNOSIS — E78.5 DYSLIPIDEMIA: ICD-10-CM

## 2022-12-06 DIAGNOSIS — E55.9 VITAMIN D DEFICIENCY: ICD-10-CM

## 2022-12-06 LAB — GLUCOSE BLDC GLUCOMTR-MCNC: 135 MG/DL (ref 70–130)

## 2022-12-06 PROCEDURE — 99214 OFFICE O/P EST MOD 30 MIN: CPT | Performed by: INTERNAL MEDICINE

## 2022-12-06 PROCEDURE — 82962 GLUCOSE BLOOD TEST: CPT | Performed by: INTERNAL MEDICINE

## 2022-12-06 RX ORDER — PEN NEEDLE, DIABETIC 32GX 5/32"
1 NEEDLE, DISPOSABLE MISCELLANEOUS DAILY
COMMUNITY
Start: 2022-09-02 | End: 2022-12-06

## 2022-12-06 RX ORDER — LANCETS 33 GAUGE
1 EACH MISCELLANEOUS
Qty: 300 EACH | Refills: 4 | Status: SHIPPED | OUTPATIENT
Start: 2022-12-06 | End: 2022-12-27 | Stop reason: SDUPTHER

## 2022-12-06 NOTE — PROGRESS NOTES
Chief Complaint  Diabetes (Type did not bring meter/Lowest BS last month: 97/Highest BS last month: around 200s/Up to date on eye exam/No hx of diabetic neuropathy or retinopathy)    Interval history is significant for an episode of bronchitis about the middle/end of October, 2022.    Otherwise interval history is negative for any new symptoms, ER visits or hospitalizations.  She presents for follow-up for type 2 diabetes.  She was last seen the office on September 2, 2022.    She previously had a sleep study and report states she has no sleep apnea.  She had a cardiology appointment for bradycardia. HR  bpm.    She had to discontinue Rybelsus due to persistent nausea.  She is on metformin 1000 mg BID and Lantus 22 units daily.    - Rybelsus: nausea  - Jardiance: UG infection  - Januvia: High RBC  - Actos: Wt gain    Diet; moderate restriction of carbs  Exercise; daily walks and games.  She plays [employer] ball.  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  On 10/21/2022 she received glucocorticoid injection.  She is considering future bilateral knee replacement.  Medications;  Metformin and Lantus 22 units daily.    Blood sugar reading in the morning range from  mg/dL.  Her glucose readings were higher following her glucocorticoid injection, ranging from 180-223 mg/dL and during period of illness ranging 122-164 mg/dL.  Previously had fasting blood sugar was 160- 170s, before she started insulin.    No hypoglycemia.  Labs; as below.    Rest of labs as below.    Epi sensitivity to inactive ingredients    Subjective          Lou Leeirene presents to [facility]  Diabetes          Diabetes (type 2) and Hyperlipidemia    Subjective          Lou Kimball presents to [facility]  History of Present Illness    Patient presents for evaluation and treatment recommendations for diabetes.  Diagnosed: 15 years  ago.    The patient moved to this area in 2017.  Her diabetes care was transitioned to Dr. Gomes in 2018.  Dr. Gomes made some changes in her medications for diabetes and the patient reports that the blood sugars since that time have been higher than before.      Previously the patient was on Metformin 1000 mg daily and Actos.  She reports her hemoglobin A1c ranged from 5 -6%.  Her Actos was discontinued and Metformin dose was increased.    Following discontinuation of Actos, the patient lost about 40 pounds, ie 210 to 175 lb.      DIET: ketodiet    EXERCISE: walks 3-5 miles a day (10-12 lb daily when in [place])    WEIGHT: Stable.  Patient monitors her weight on her home scale.  This morning she was 175.6 lb.    DM MEDICATION REGIMEN:  Off actos  januvia 100 milligrams daily.-> Fissures in groin, Ozempicxx  jardiance 10 milligrams daily.-> Hct  Metformin 2000 mg daily in divided doses.    Compliance: good    Statin therapy: Atorvastatin (changed from previous simvastatin).    BP regimen: none    Patient usually does not check her fingerstick glucose.  She has checked her glucose readings over the past 1 week for this appointment.    -145 mg/dL  Before lunch 120 mg/dL  Before dinner NA  Bed time 121 mg/dL    Hypoglycemia awareness: yes  No rececnt episodes.  Episodes of severe hypoglycemia: denies  Hospitalization with HONK/ DKA: denies    Macrovascular complications:  - Cardiovascular disease: denies  - Cerebrovascular disease: denies  - Peripheral artrial disease: denies    Microvascular complications:  - Diabetic retinopathy: denies  Last comprehensive diabetic eye exam 4/2021  - Diabetic nephropathy: denies  - Diabetic neuropathy: denies    Results for JOSHUA PUENTES (MRN [id number]) as of 12/20/2021 08:16   Ref. Range 7/22/2021 08:21 11/15/2021 08:09   Hemoglobin A1C Latest Ref Range: 4.8 - 5.6 % 7.7 7.5 (H)       Review of Systems is as per history of presenting illness otherwise negative.        Objective  "  Vital Signs:   /90   Pulse 82   Temp 96.9 °F (36.1 °C) (Temporal)   Ht 170.2 cm (67\")   Wt 79.8 kg (176 lb) Comment: Patient refuses to get weight  SpO2 98%   BMI 27.57 kg/m²     Physical Exam       General: She is not in acute distress.     Appearance: She is not ill-appearing, toxic-appearing or diaphoretic.   HENT:      Head: Normocephalic and atraumatic.      Nose: Nose normal.      Mouth/Throat:      Mouth: Mucous membranes are moist.      Pharynx: Oropharynx is clear. No oropharyngeal exudate or posterior oropharyngeal erythema.   Eyes:      General: No scleral icterus.     Extraocular Movements: Extraocular movements intact.      Conjunctiva/sclera: Conjunctivae normal.      Pupils: Pupils are equal, round, and reactive to light.   Neck:      Thyroid: No thyroid mass, thyromegaly or thyroid tenderness.      Vascular: No carotid bruit.      Trachea: Trachea normal.   Cardiovascular:      Rate and Rhythm: Normal rate and regular rhythm.      Pulses: Normal pulses.      Heart sounds: Normal heart sounds. No murmur heard.  No friction rub. No gallop.    Pulmonary:      Effort: Pulmonary effort is normal. No respiratory distress.      Breath sounds: Normal breath sounds. No stridor. No wheezing, rhonchi or rales.   Chest:      Chest wall: No tenderness.   Abdominal:      General: Bowel sounds are normal. There is no distension.      Palpations: Abdomen is soft. There is no mass.      Tenderness: There is no abdominal tenderness. There is no rebound.   Musculoskeletal:         General: No swelling, tenderness, deformity or signs of injury. Normal range of motion.      Cervical back: Normal range of motion and neck supple. No rigidity or tenderness.      Right lower leg: No edema.      Left lower leg: No edema.   Lymphadenopathy:      Cervical: No cervical adenopathy.   Skin:     General: Skin is warm and dry.      Capillary Refill: Capillary refill takes 2 to 3 seconds.      Coloration: Skin is not " jaundiced or pale.      Findings: No bruising, erythema, lesion or rash.   Neurological:      General: No focal deficit present.      Mental Status: She is alert and oriented to person, place, and time. Mental status is at baseline.      Cranial Nerves: No cranial nerve deficit.      Sensory: No sensory deficit.      Motor: No weakness.      Coordination: Coordination normal.      Gait: Gait normal.      Deep Tendon Reflexes: Reflexes normal.   Psychiatric:         Mood and Affect: Mood normal.         Behavior: Behavior normal.         Thought Content: Thought content normal.         Judgment: Judgment normal.        Diabetic foot exam:   Left: Filament test present   Pulses Dorsalis Pedis:  present   Reflexes 2+    Vibratory sensation normal   Proprioception normal   Sharp/dull discrimination normal     Right: Filament test present   Pulses Dorsalis Pedis:  present   Reflexes 2+    Vibratory sensation normal   Proprioception normal   Sharp/dull discrimination normal     Result Review :     CMP    CMP 5/26/22 8/26/22 11/22/22   Glucose 153 (A) 162 (A) 159 (A)   BUN 9 8 10   Creatinine 0.78 0.74 0.72   Sodium 143 141 138   Potassium 4.4 4.7 4.5   Chloride 103 101 103   Calcium 9.7 9.7 9.6   Total Protein 6.8 6.6 6.5   Albumin 4.3 4.6 4.20   Globulin 2.5 2.0 2.3   Total Bilirubin 0.6 0.6 0.6   Alkaline Phosphatase 120 97 99   AST (SGOT) 23 19 20   ALT (SGPT) 37 (A) 35 (A) 24   (A) Abnormal value            Lipid Panel    Lipid Panel 4/13/22 8/26/22 11/22/22   Total Cholesterol 155 149 162   Triglycerides 171 (A) 175 (A) 180 (A)   HDL Cholesterol 51 52 60   VLDL Cholesterol 29 29 30   LDL Cholesterol  75 68 72   (A) Abnormal value       Comments are available for some flowsheets but are not being displayed.           TSH    TSH 8/26/22 11/22/22   TSH 1.320 1.680           Most Recent A1C    HGBA1C Most Recent 11/22/22   Hemoglobin A1C 8.40 (A)   (A) Abnormal value       Comments are available for some flowsheets but  are not being displayed.               A1C Last 3 Results    HGBA1C Last 3 Results 4/13/22 8/26/22 11/22/22   Hemoglobin A1C 7.9 (A) 8.0 (A) 8.40 (A)   (A) Abnormal value       Comments are available for some flowsheets but are not being displayed.           Microalbumin    Microalbumin 4/13/22 8/26/22   Microalbumin, Urine <3.0 <3.0      Comments are available for some flowsheets but are not being displayed.                     Assessment and Plan    Diagnoses and all orders for this visit:    1. Type 2 diabetes mellitus with complication, without long-term current use of insulin (HCC) (Primary)  -     POC Glucose Fingerstick  -     glucose blood (OneTouch Verio) test strip; Check 3 times a day on insulin tx, poor control,  Dx: E 1  Dispense: 300 each; Refill: 4  -     Lancets (OneTouch Delica Plus [id number]) misc; 1 each 5 (Five) Times a Day. Check 3 times a day on insulin tx, poor control. Dx: E 1  Dispense: 300 each; Refill: 4  -     Hemoglobin A1c; Future  -     Comprehensive Metabolic Panel; Future  -     Lipid Panel; Future  -     Microalbumin / Creatinine Urine Ratio - Urine, Clean Catch; Future  -     CBC Auto Differential; Future    2. Dyslipidemia  -     Lipid Panel; Future  -     TSH; Future    3. Vitamin D deficiency  -     Vitamin D,25-Hydroxy; Future    4. Abnormal LFTs  -     Comprehensive Metabolic Panel; Future         Assessment and Plan    There are no diagnoses linked to this encounter.    I spent 35 minutes caring for Lou on this date of service. This time includes time spent by me in the following activities:reviewing tests, obtaining and/or reviewing a separately obtained history, performing a medically appropriate examination and/or evaluation , counseling and educating the patient/family/caregiver, ordering medications, tests, or procedures, referring and communicating with other health care professionals , documenting information in the medical record and independently interpreting  results and communicating that information with the patient/family/caregiver  Follow Up   No follow-ups on file.  Patient was given instructions and counseling regarding her condition or for health maintenance advice. Please see specific information pulled into the AVS if appropriate.     TYPE 2 DM:  1. Check fingerstick glucose as directed, i.e. 4 times a day in a staggered fashion. Bring glucose log to every visit.  Send your blood sugar log to the office in 2 weeks.   2. Restrict carb intake.   3. Maintain/intensify and exercise routine.    4. Medication: continue metformin as directed and Lantus 22 units subcutaneous injection.   5.  Diabetic foot care as directed  6. Follow up with ophthalmology per schedule for comprehensive diabetic eye exam   7. RTC with labs drawn one week before the follow up appointment  8. RTC in 3-4 months    POLYCYTHEMIA:  Resolved off Jardiance    ABNORMAL LFTs:  ? Suspect fatty liver.  Recommend to intensify lifestyle changes.  Patient is not interested in GLP-1 RA therapy.  Recommend on weight loss of 10% or more of her current body weight.   Notify PCP to exclude alternate causes of abnormal LFTs.    INSOMNIA:  Seen by sleep medicine.  She does not have obstructive sleep apnea.    HIGH BP:  WNL usually, however in the office she states her blood pressure is higher than usual.    HLD:   Continue lifestyle changes. Continue statin therapy.    Follow Up   Return in about 4 months (around 4/6/2023).  Patient was given instructions and counseling regarding her condition or for health maintenance advice. Please see specific information pulled into the AVS if appropriate.

## 2022-12-20 ENCOUNTER — TREATMENT (OUTPATIENT)
Dept: ENDOCRINOLOGY | Age: 67
End: 2022-12-20

## 2022-12-20 DIAGNOSIS — E11.8 TYPE 2 DIABETES MELLITUS WITH COMPLICATION, WITHOUT LONG-TERM CURRENT USE OF INSULIN: ICD-10-CM

## 2022-12-20 RX ORDER — INSULIN GLARGINE 100 [IU]/ML
25 INJECTION, SOLUTION SUBCUTANEOUS DAILY
Qty: 30 ML | Refills: 1 | Status: SHIPPED | OUTPATIENT
Start: 2022-12-20 | End: 2023-03-20

## 2022-12-27 DIAGNOSIS — E11.8 TYPE 2 DIABETES MELLITUS WITH COMPLICATION, WITHOUT LONG-TERM CURRENT USE OF INSULIN: ICD-10-CM

## 2022-12-27 RX ORDER — LANCETS 33 GAUGE
1 EACH MISCELLANEOUS 3 TIMES DAILY
Qty: 300 EACH | Refills: 3 | Status: SHIPPED | OUTPATIENT
Start: 2022-12-27

## 2023-01-16 DIAGNOSIS — E78.41 ELEVATED LIPOPROTEIN(A): ICD-10-CM

## 2023-01-16 RX ORDER — ATORVASTATIN CALCIUM 20 MG/1
TABLET, FILM COATED ORAL
Qty: 90 TABLET | Refills: 1 | Status: SHIPPED | OUTPATIENT
Start: 2023-01-16

## 2023-02-23 ENCOUNTER — TELEPHONE (OUTPATIENT)
Dept: ENDOCRINOLOGY | Age: 68
End: 2023-02-23

## 2023-02-23 DIAGNOSIS — E11.9 TYPE 2 DIABETES MELLITUS WITHOUT COMPLICATION, WITHOUT LONG-TERM CURRENT USE OF INSULIN: ICD-10-CM

## 2023-02-23 RX ORDER — METFORMIN HYDROCHLORIDE 500 MG/1
TABLET, EXTENDED RELEASE ORAL
Qty: 360 TABLET | Refills: 1 | Status: SHIPPED | OUTPATIENT
Start: 2023-02-23 | End: 2023-05-01 | Stop reason: SINTOL

## 2023-02-23 NOTE — TELEPHONE ENCOUNTER
PT CALLED FOR A MED REFILL REQUEST OF THE BELOW MED:    metFORMIN ER (GLUCOPHAGE-XR) 500 MG 24 hr tablet [15842]      GOING TO:    [id number] [place] - [address] [address] AT [facility] [address] - [phone]  - [phone] FX

## 2023-03-21 ENCOUNTER — TELEPHONE (OUTPATIENT)
Dept: GASTROENTEROLOGY | Facility: CLINIC | Age: 68
End: 2023-03-21

## 2023-03-21 NOTE — TELEPHONE ENCOUNTER
"  Caller: Lou Kimball \"Samanta\"    Relationship: Self    Best call back number: [phone]     What is the best time to reach you: ANYTIME    What was the call regarding: PATIENT STATED SHE WOULD LIKE TO CONFIRM THE ARRIVAL TIME FOR HER UPCOMING COLONOSCOPY THAT IS CURRENTLY SCHEDULED FOR 4/17/23. PATIENT STATED THAT HER PREP INSTRUCTIONS STATED SHE SHOULD ARRIVE AT 6:00AM. OK TO M.    Do you require a callback: YES          "

## 2023-04-05 NOTE — PROGRESS NOTES
Mailed letter for repeat colonoscopy   Telemetry monitoring  Continue home diltiazem ER 240mg qd  Troponin wnl

## 2023-04-12 NOTE — DISCHARGE INSTRUCTIONS
Education provided the Patient on the following:    - Nothing to Eat or Drink after MN the night before the procedure    - Avoid red/purple fluids while completing their bowel prep as ordered by physician  -Contact Gastrointerologist office for any questions about specific details regarding colon prep    -You will need to have someone drive you home after your colonoscopy and remain with you for 24 hours after the procedure  - The date of your Surgery, you may have one visitor at bedside or within 10-15 minutes of [employer] [place]  -Please wear warm socks when you arrive for your colonoscopy  -Remove all jewelry and leave any valuables before arriving the day of your procedure (all will have to be removed before leaving preop)  -You will need to arrive at 0600 on 4/17/23 for your colonoscopy    -Feel free to contact us at: [phone] with any additional questions/concerns    ENDOSCOPY - EGD/COLONOSCOPY       ADULT CARE DISCHARGE  INSTRUCTIONS     Symptoms you may temporarily experience:      Sore Throat     Hoarseness     Bloating/Cramping     Dizziness     IV Irritation/tenderness     Gas or Belching     Slight fever     Small amount of blood in vomit or stool       Call Your Doctor for the following Problems: ______________________     ________________     Fever of 101 degrees or higher       Sharp abdominal  pain     Red streak up the arm from the IV site     Severe cramping        Large amount of blood in stool or vomit       Instructions for the next 24 hours after your Procedure:     Adult supervision     Do NOT drink any alcohol      Do not work today     NO important decisions     DO NOT sign any legal documents     You may shower/ bathe       DO NOT  DRIVE or operate machinery     Resume normal activity tomorrow       Discharge  Diet:     Avoid spicy/ greasy foods     Avoid any food that will cause more gas or bloating       *** Seek IMMEDIATE medical attention and call 911 if you develop symptoms such  as:     Chest pain     Shortness of breath     Severe bleeding

## 2023-04-17 ENCOUNTER — HOSPITAL ENCOUNTER (OUTPATIENT)
Facility: SURGERY CENTER | Age: 68
Setting detail: HOSPITAL OUTPATIENT SURGERY
Discharge: HOME | End: 2023-04-17
Attending: INTERNAL MEDICINE | Admitting: INTERNAL MEDICINE
Payer: MEDICARE

## 2023-04-17 ENCOUNTER — ANESTHESIA EVENT (OUTPATIENT)
Dept: SURGERY | Facility: SURGERY CENTER | Age: 68
End: 2023-04-17
Payer: MEDICARE

## 2023-04-17 ENCOUNTER — ANESTHESIA (OUTPATIENT)
Dept: SURGERY | Facility: SURGERY CENTER | Age: 68
End: 2023-04-17
Payer: MEDICARE

## 2023-04-17 VITALS
HEART RATE: 74 BPM | RESPIRATION RATE: 16 BRPM | OXYGEN SATURATION: 96 % | SYSTOLIC BLOOD PRESSURE: 127 MMHG | DIASTOLIC BLOOD PRESSURE: 73 MMHG | WEIGHT: 181.2 LBS | HEIGHT: 67 IN | BODY MASS INDEX: 28.44 KG/M2 | TEMPERATURE: 97.7 F

## 2023-04-17 LAB — GLUCOSE BLDC GLUCOMTR-MCNC: 164 MG/DL (ref 70–130)

## 2023-04-17 PROCEDURE — G0121 COLON CA SCRN NOT HI RSK IND: HCPCS | Performed by: INTERNAL MEDICINE

## 2023-04-17 PROCEDURE — 25010000002 PROPOFOL 10 MG/ML EMULSION: Performed by: ANESTHESIOLOGY

## 2023-04-17 RX ORDER — PROPOFOL 10 MG/ML
VIAL (ML) INTRAVENOUS AS NEEDED
Status: DISCONTINUED | OUTPATIENT
Start: 2023-04-17 | End: 2023-04-17 | Stop reason: SURG

## 2023-04-17 RX ORDER — SODIUM CHLORIDE 0.9 % (FLUSH) 0.9 %
10 SYRINGE (ML) INJECTION AS NEEDED
Status: DISCONTINUED | OUTPATIENT
Start: 2023-04-17 | End: 2023-04-17 | Stop reason: HOSPADM

## 2023-04-17 RX ORDER — SODIUM CHLORIDE, SODIUM LACTATE, POTASSIUM CHLORIDE, CALCIUM CHLORIDE 600; 310; 30; 20 MG/100ML; MG/100ML; MG/100ML; MG/100ML
INJECTION, SOLUTION INTRAVENOUS CONTINUOUS PRN
Status: DISCONTINUED | OUTPATIENT
Start: 2023-04-17 | End: 2023-04-17 | Stop reason: SURG

## 2023-04-17 RX ORDER — SODIUM CHLORIDE 0.9 % (FLUSH) 0.9 %
3 SYRINGE (ML) INJECTION EVERY 12 HOURS SCHEDULED
Status: DISCONTINUED | OUTPATIENT
Start: 2023-04-17 | End: 2023-04-17 | Stop reason: HOSPADM

## 2023-04-17 RX ORDER — SODIUM CHLORIDE, SODIUM LACTATE, POTASSIUM CHLORIDE, CALCIUM CHLORIDE 600; 310; 30; 20 MG/100ML; MG/100ML; MG/100ML; MG/100ML
30 INJECTION, SOLUTION INTRAVENOUS CONTINUOUS PRN
Status: DISCONTINUED | OUTPATIENT
Start: 2023-04-17 | End: 2023-04-17 | Stop reason: HOSPADM

## 2023-04-17 RX ORDER — SODIUM CHLORIDE, SODIUM LACTATE, POTASSIUM CHLORIDE, CALCIUM CHLORIDE 600; 310; 30; 20 MG/100ML; MG/100ML; MG/100ML; MG/100ML
1000 INJECTION, SOLUTION INTRAVENOUS CONTINUOUS
Status: DISCONTINUED | OUTPATIENT
Start: 2023-04-17 | End: 2023-04-17 | Stop reason: HOSPADM

## 2023-04-17 RX ORDER — LIDOCAINE HYDROCHLORIDE 20 MG/ML
INJECTION, SOLUTION INFILTRATION; PERINEURAL AS NEEDED
Status: DISCONTINUED | OUTPATIENT
Start: 2023-04-17 | End: 2023-04-17 | Stop reason: SURG

## 2023-04-17 RX ORDER — LIDOCAINE HYDROCHLORIDE 10 MG/ML
0.5 INJECTION, SOLUTION INFILTRATION; PERINEURAL ONCE AS NEEDED
Status: DISCONTINUED | OUTPATIENT
Start: 2023-04-17 | End: 2023-04-17 | Stop reason: HOSPADM

## 2023-04-17 RX ADMIN — SODIUM CHLORIDE, POTASSIUM CHLORIDE, SODIUM LACTATE AND CALCIUM CHLORIDE 1000 ML: 600; 310; 30; 20 INJECTION, SOLUTION INTRAVENOUS at 06:41

## 2023-04-17 RX ADMIN — PROPOFOL 200 MCG/KG/MIN: 10 INJECTION, EMULSION INTRAVENOUS at 07:32

## 2023-04-17 RX ADMIN — SODIUM CHLORIDE, SODIUM LACTATE, POTASSIUM CHLORIDE, AND CALCIUM CHLORIDE: .6; .31; .03; .02 INJECTION, SOLUTION INTRAVENOUS at 07:29

## 2023-04-17 RX ADMIN — LIDOCAINE HYDROCHLORIDE 60 MG: 20 INJECTION, SOLUTION INFILTRATION; PERINEURAL at 07:32

## 2023-04-17 RX ADMIN — PROPOFOL 100 MG: 10 INJECTION, EMULSION INTRAVENOUS at 07:32

## 2023-04-17 NOTE — ANESTHESIA POSTPROCEDURE EVALUATION
"Patient: Lou Kimball    Procedure Summary     Date: 04/17/23 Room / Location: [facility]    Anesthesia Start: 0729 Anesthesia Stop: 0751    Procedure: COLONOSCOPY FOR SCREENING Diagnosis:       Encounter for screening for malignant neoplasm of colon      (Encounter for screening for malignant neoplasm of colon [Z12.11])    Surgeons: Vargas Cid MD Provider: Kacey Livingston MD    Anesthesia Type: MAC ASA Status: 2          Anesthesia Type: No value filed.    Vitals  Vitals Value Taken Time   /75 04/17/23 0804   Temp 36.5 °C (97.7 °F) 04/17/23 0752   Pulse 80 04/17/23 0804   Resp 16 04/17/23 0804   SpO2 93 % 04/17/23 0804           Post Anesthesia Care and Evaluation    Patient location during evaluation: bedside  Patient participation: complete - patient participated  Level of consciousness: awake  Pain management: adequate    Airway patency: patent  Anesthetic complications: No anesthetic complications    Cardiovascular status: acceptable  Respiratory status: acceptable  Hydration status: acceptable    Comments: /75   Pulse 80   Temp 36.5 °C (97.7 °F) (Tympanic)   Resp 16   Ht 170.2 cm (67\")   Wt 82.2 kg (181 lb 3.2 oz)   LMP  (LMP Unknown)   SpO2 93%   BMI 28.38 kg/m²       "

## 2023-04-17 NOTE — H&P
"No chief complaint on file.      HPI  Patient today for a screening colonoscopy.  She had a colonoscopy in 2012 with sigmoid diverticulosis.         Problem List:    Patient Active Problem List   Diagnosis   • Type 2 diabetes mellitus without complication, without long-term current use of insulin   • Hyperlipidemia   • Environmental allergies   • Reactive airway disease without complication   • GERD (gastroesophageal reflux disease)   • Elevated hematocrit   • Encounter for screening for malignant neoplasm of colon       Medical History:    Past Medical History:   Diagnosis Date   • Asthma    • Bronchitis 03/20/2020   • Diabetes mellitus    • Fibroid    • GERD (gastroesophageal reflux disease)    • Hyperlipidemia         Social History:    Social History     Socioeconomic History   • Marital status:    Tobacco Use   • Smoking status: Never   • Smokeless tobacco: Never   Vaping Use   • Vaping Use: Never used   Substance and Sexual Activity   • Alcohol use: Never     Comment: caffeine use-' \"rare\"   • Drug use: Never   • Sexual activity: Yes     Partners: Male     Birth control/protection: Surgical     Comment:  , TVH wth OC       Family History:   Family History   Problem Relation Age of Onset   • Diabetes Mother    • COPD Mother    • COPD Father    • Heart disease Father    • Lymphoma Father    • Heart attack Father    • Pancreatitis Sister    • Pancreatitis Brother    • Cancer Maternal Grandfather         bladder   • Lung cancer Paternal Grandfather    • Diabetes Paternal Uncle    • Hypertension Maternal Grandmother    • Breast cancer Neg Hx    • Colon cancer Neg Hx    • Ovarian cancer Neg Hx    • Pulmonary embolism Neg Hx    • Deep vein thrombosis Neg Hx        Surgical History:   Past Surgical History:   Procedure Laterality Date   • ANKLE ARTHROSCOPY Right    • BREAST BIOPSY Right 2017    stereotatctic   • BREAST EXCISIONAL BIOPSY Right 1979   • CATARACT EXTRACTION, BILATERAL  05/17/2019    and " 5/21/2019   • CHOLECYSTECTOMY     • COLONOSCOPY     • D & C WITH SUCTION      x 4   • HYSTERECTOMY      TVH with OC for fibroids   • KNEE ARTHROSCOPY Bilateral    • MOUTH SURGERY  03/12/2020   • TUBAL ABDOMINAL LIGATION  1986   • WISDOM TOOTH EXTRACTION           Current Facility-Administered Medications:   •  lactated ringers infusion 1,000 mL, 1,000 mL, Intravenous, Continuous, Vargas Cid MD, Last Rate: 25 mL/hr at 04/17/23 0641, 1,000 mL at 04/17/23 0641  •  lidocaine (XYLOCAINE) 1 % injection 0.5 mL, 0.5 mL, Intradermal, Once PRN, Vargas Cid MD  •  sodium chloride 0.9 % flush 10 mL, 10 mL, Intravenous, PRN, Vargas Cid MD    Allergies:   Allergies   Allergen Reactions   • Codeine Hives and GI Intolerance   • Dextromethorphan Hives   • Phenergan [Promethazine Hcl] Hives   • Tetracyclines & Related Hives        The following portions of the patient's history were reviewed by me and updated as appropriate: review of systems, allergies, current medications, past family history, past medical history, past social history, past surgical history and problem list.    Vitals:    04/17/23 0635   BP: 143/82   Pulse: 75   Resp: 16   Temp: 98.4 °F (36.9 °C)   SpO2: 97%       PHYSICAL EXAM:    CONSTITUTIONAL:  today's vital signs reviewed by me  GASTROINTESTINAL: abdomen is soft nontender nondistended with normal active bowel sounds, no masses are appreciated    Assessment/ Plan  We will proceed today with colonoscopy.    Risks and benefits as well as alternatives to endoscopic evaluation were explained to the patient and they voiced understanding and wish to proceed.  These risks include but are not limited to the risk of bleeding, perforation, adverse reaction to sedation, and missed lesions.  The patient was given the opportunity to ask questions prior to the endoscopic procedure.

## 2023-04-17 NOTE — ANESTHESIA PREPROCEDURE EVALUATION
Anesthesia Evaluation     NPO Solid Status: > 8 hours  NPO Liquid Status: > 2 hours           Airway   Mallampati: II  TM distance: >3 FB  Neck ROM: full  Dental - normal exam     Pulmonary    (+) asthma,  (-) not a smoker  Cardiovascular   Exercise tolerance: good (4-7 METS)    Rhythm: regular  Rate: normal    (+) angina, hyperlipidemia,   (-) past MI      Neuro/Psych  (-) seizures, CVA  GI/Hepatic/Renal/Endo    (+)  GERD well controlled,  diabetes mellitus type 2 well controlled,     Musculoskeletal     Abdominal    Substance History      OB/GYN          Other                        Anesthesia Plan    ASA 2     MAC     intravenous induction     Anesthetic plan, risks, benefits, and alternatives have been provided, discussed and informed consent has been obtained with: patient.        CODE STATUS:

## 2023-05-01 ENCOUNTER — OFFICE VISIT (OUTPATIENT)
Dept: ENDOCRINOLOGY | Age: 68
End: 2023-05-01
Payer: MEDICARE

## 2023-05-01 VITALS
DIASTOLIC BLOOD PRESSURE: 78 MMHG | SYSTOLIC BLOOD PRESSURE: 120 MMHG | WEIGHT: 174.2 LBS | BODY MASS INDEX: 27.34 KG/M2 | OXYGEN SATURATION: 98 % | HEIGHT: 67 IN | HEART RATE: 97 BPM | TEMPERATURE: 97.1 F

## 2023-05-01 DIAGNOSIS — E78.5 HYPERLIPIDEMIA, UNSPECIFIED HYPERLIPIDEMIA TYPE: ICD-10-CM

## 2023-05-01 DIAGNOSIS — E11.65 TYPE 2 DIABETES MELLITUS WITH HYPERGLYCEMIA, WITH LONG-TERM CURRENT USE OF INSULIN: Primary | ICD-10-CM

## 2023-05-01 DIAGNOSIS — Z79.4 TYPE 2 DIABETES MELLITUS WITH HYPERGLYCEMIA, WITH LONG-TERM CURRENT USE OF INSULIN: Primary | ICD-10-CM

## 2023-05-01 PROBLEM — E78.49 OTHER HYPERLIPIDEMIA: Status: ACTIVE | Noted: 2019-03-27

## 2023-05-01 RX ORDER — INSULIN GLARGINE 100 [IU]/ML
22-25 INJECTION, SOLUTION SUBCUTANEOUS DAILY
Qty: 24 ML | Refills: 1 | Status: SHIPPED | OUTPATIENT
Start: 2023-05-01

## 2023-05-01 RX ORDER — PEN NEEDLE, DIABETIC 32GX 5/32"
NEEDLE, DISPOSABLE MISCELLANEOUS
Qty: 100 EACH | Refills: 2 | Status: SHIPPED | OUTPATIENT
Start: 2023-05-01

## 2023-05-01 NOTE — PATIENT INSTRUCTIONS
Increase Lantus 22-25 units daily  Stop metformin ER 2 tablets twice daily due to diarrhea  Check blood sugars in the morning when you get up and again either before lunch/dinner or 2 hours after lunch/dinner.  Send me readings in a couple of weeks

## 2023-05-01 NOTE — PROGRESS NOTES
"Chief Complaint  Chief Complaint   Patient presents with   • Diabetes     TYPE 2 DM       Subjective          History of Present Illness    Lou Kimball 67 y.o. presents for a follow-up evaluation for type 2 DM    She has been diabetic since around 2006 and started insulin in 2022    Pt is on the following medications for their DM: Lantus 20-23 units daily and metformin ER 2 tablets twice daily    Actos caused weight gain  Jardiance stopped due to perineal rash  Januvia caused elevated RBCs  Rybelsus stopped due to GI side effects  Pt states that she may have an allergy to sulfa - Hives - but can't remember because its been several years since taking it.        Pt complains of diarrhea    Denies constipation, chest pain, shortness of breath, vision changes or numbness and tingling in feet/hands.    Weight loss of 2 lbs since last visit.    Pt does not have a history of diabetic retinopathy.  Last eye exam was 08/22    Pt does not have a history of nephropathy.  Patient is not currently taking ACE/ARB    Pt does not have neuropathy.    Pt does not have a history of CAD or CVA.    Last A1C in 04/23 was 7.5    Last microalbumin in 04/23 was negative          Blood Sugars    Blood glucoses are checked 1/day.    Fasting blood glucoses: 110s - 120s    Pt has no episodes of hypoglycemia.            Hyperlipidemia     Pt is currently taking atorvastatin 20 mg HS  Was on simvastatin in the past    Last lipid panel in 04/23 showed Total 158, HDL 52, LDL 70 and Triglycerides 218          I have reviewed the patient's allergies, medicines, past medical hx, family hx and social hx.    Objective   Vital Signs:   /78   Pulse 97   Temp 97.1 °F (36.2 °C)   Ht 170.2 cm (67.01\")   Wt 79 kg (174 lb 3.2 oz)   LMP  (LMP Unknown)   SpO2 98%   BMI 27.28 kg/m²       Physical Exam   Physical Exam  Constitutional:       General: She is not in acute distress.     Appearance: Normal appearance. She is not diaphoretic.   HENT:     "  Head: Normocephalic and atraumatic.   Eyes:      General:         Right eye: No discharge.         Left eye: No discharge.   Skin:     General: Skin is warm and dry.   Neurological:      Mental Status: She is alert.   Psychiatric:         Mood and Affect: Mood normal.         Behavior: Behavior normal.                    Results Review:   Hemoglobin A1C   Date Value Ref Range Status   04/11/2023 7.50 (H) 4.80 - 5.60 % Final     Comment:     Hemoglobin A1C Ranges:  Increased Risk for Diabetes  5.7% to 6.4%  Diabetes                     >= 6.5%  Diabetic Goal                < 7.0%     07/22/2021 7.7 % Final     Triglycerides   Date Value Ref Range Status   04/11/2023 218 (H) 0 - 150 mg/dL Final     HDL Cholesterol   Date Value Ref Range Status   04/11/2023 52 40 - 60 mg/dL Final     LDL Chol Calc (NIH)   Date Value Ref Range Status   04/11/2023 70 0 - 100 mg/dL Final     VLDL Cholesterol Fede   Date Value Ref Range Status   04/11/2023 36 5 - 40 mg/dL Final     LDL/HDL RATIO   Date Value Ref Range Status   04/15/2021 1.43  Final         Assessment and Plan {CC Problem List  Visit Diagnosis  ROS  Review (Popup)  Health Maintenance  Quality  BestPractice  Medications  SmartSets  SnapShot Encounters  Media :23  Diagnoses and all orders for this visit:    1. Type 2 diabetes mellitus with hyperglycemia, with long-term current use of insulin (Primary)  -     glucose blood test strip; Dispense based on insurance preference: Check 3 times a day Dx: E 11.65  Dispense: 300 each; Refill: 4  -     Lantus SoloStar 100 UNIT/ML injection pen; Inject 22-25 Units under the skin into the appropriate area as directed Daily.  Dispense: 24 mL; Refill: 1  -     Insulin Pen Needle (BD Pen Needle Negar 2nd Gen) 32G X 4 MM misc; For use with pen device daily. Can substitute based on availability and insurance.  Dispense: 100 each; Refill: 2  -     Hemoglobin A1c; Future  -     Comprehensive Metabolic Panel; Future    A1c is better  at 7.5%  No protein in urine  Increase Lantus 22-25 units daily  Stop metformin ER 2 tablets twice daily due to diarrhea  Check blood sugars in the morning when you get up and again either before lunch/dinner or 2 hours after lunch/dinner.  Send me readings in a couple of weeks  If BGs during the day are high, then will have to start meal time insulin; pt is unable to tolerate several diabetic medications due to SE and will avoid sulfonylureas  due to possible SE of hives        2. Hyperlipidemia, unspecified hyperlipidemia type  -     Comprehensive Metabolic Panel; Future  -     Lipid Panel; Future    Total cholesterol and LDL are normal, continue with statin.    Triglycerides are elevated, decrease dietary fat and carbohydrate intake.            RTC in 3-4 months with me, labs prior and 7-8 months with Dr. Richards      Follow Up     Patient was given instructions and counseling regarding her condition or for health maintenance advice. Please see specific information pulled into the AVS if appropriate.              Catina Wang, APRN  05/01/23

## 2023-05-03 ENCOUNTER — TELEPHONE (OUTPATIENT)
Dept: ENDOCRINOLOGY | Age: 68
End: 2023-05-03

## 2023-05-03 NOTE — TELEPHONE ENCOUNTER
PT CALLED AND STATED SINCE SHE WAS TAKEN OFF OF METFORMIN HER BLOOD SUGAR HAS WENT UP TO OVER 200. PT WANTS TO BE PLACED BACK ON METFORMIN.      EDDIE PUENTES [phone]

## 2023-05-04 RX ORDER — METFORMIN HYDROCHLORIDE 500 MG/1
1000 TABLET, EXTENDED RELEASE ORAL 2 TIMES DAILY WITH MEALS
Qty: 360 TABLET | Refills: 1
Start: 2023-05-04

## 2023-05-17 RX ORDER — BLOOD-GLUCOSE METER
EACH MISCELLANEOUS
Qty: 1 KIT | Refills: 0 | Status: SHIPPED | OUTPATIENT
Start: 2023-05-17

## 2023-05-23 DIAGNOSIS — E11.65 TYPE 2 DIABETES MELLITUS WITH HYPERGLYCEMIA, WITH LONG-TERM CURRENT USE OF INSULIN: ICD-10-CM

## 2023-05-23 DIAGNOSIS — Z79.4 TYPE 2 DIABETES MELLITUS WITH HYPERGLYCEMIA, WITH LONG-TERM CURRENT USE OF INSULIN: ICD-10-CM

## 2023-05-23 RX ORDER — INSULIN GLARGINE 100 [IU]/ML
22-30 INJECTION, SOLUTION SUBCUTANEOUS DAILY
Qty: 30 ML | Refills: 1 | Status: SHIPPED | OUTPATIENT
Start: 2023-05-23

## 2023-05-25 ENCOUNTER — OFFICE VISIT (OUTPATIENT)
Dept: FAMILY MEDICINE CLINIC | Facility: CLINIC | Age: 68
End: 2023-05-25
Payer: MEDICARE

## 2023-05-25 VITALS
BODY MASS INDEX: 27.15 KG/M2 | OXYGEN SATURATION: 99 % | SYSTOLIC BLOOD PRESSURE: 118 MMHG | HEART RATE: 75 BPM | DIASTOLIC BLOOD PRESSURE: 70 MMHG | HEIGHT: 67 IN | TEMPERATURE: 96.7 F | WEIGHT: 173 LBS

## 2023-05-25 DIAGNOSIS — Z00.00 MEDICARE ANNUAL WELLNESS VISIT, SUBSEQUENT: Primary | ICD-10-CM

## 2023-05-25 DIAGNOSIS — E78.41 ELEVATED LIPOPROTEIN(A): ICD-10-CM

## 2023-05-25 PROCEDURE — G0439 PPPS, SUBSEQ VISIT: HCPCS | Performed by: FAMILY MEDICINE

## 2023-05-25 PROCEDURE — 1160F RVW MEDS BY RX/DR IN RCRD: CPT | Performed by: FAMILY MEDICINE

## 2023-05-25 PROCEDURE — 1159F MED LIST DOCD IN RCRD: CPT | Performed by: FAMILY MEDICINE

## 2023-05-25 PROCEDURE — 3051F HG A1C>EQUAL 7.0%<8.0%: CPT | Performed by: FAMILY MEDICINE

## 2023-05-25 PROCEDURE — 1170F FXNL STATUS ASSESSED: CPT | Performed by: FAMILY MEDICINE

## 2023-05-25 RX ORDER — ATORVASTATIN CALCIUM 20 MG/1
20 TABLET, FILM COATED ORAL
Qty: 90 TABLET | Refills: 3 | Status: SHIPPED | OUTPATIENT
Start: 2023-05-25

## 2023-05-25 NOTE — PROGRESS NOTES
The ABCs of the Annual Wellness Visit  Subsequent Medicare Wellness Visit    Subjective      Lou Kimball is a 67 y.o. female who presents for a Subsequent Medicare Wellness Visit.    The following portions of the patient's history were reviewed and   updated as appropriate: allergies, current medications, past family history, past medical history, past social history, past surgical history and problem list.    Compared to one year ago, the patient feels her physical   health is the same.    Compared to one year ago, the patient feels her mental   health is the same.    Recent Hospitalizations:  She was not admitted to the hospital during the last year.       Current Medical Providers:  Patient Care Team:  Caroline Rudd MD as PCP - General (Family Medicine)  Caroline Rudd MD as Referring Physician (Family Medicine)    Outpatient Medications Prior to Visit   Medication Sig Dispense Refill   • aspirin 81 MG chewable tablet Chew 1 tablet Daily.     • Blood Glucose Monitoring Suppl (OneTouch Verio IQ System) w/Device kit Dispense one kit and related supplies to check 5 times a day. Dx: E 1 1 kit 0   • estradiol (ESTRACE) 0.1 MG/GM vaginal cream Insert 1 g into the vagina 2 (Two) Times a Week. 45 g 6   • glucose blood test strip Dispense based on insurance preference: Check 3 times a day Dx: E 11.65 300 each 4   • Insulin Pen Needle (BD Pen Needle Negar 2nd Gen) 32G X 4 MM misc For use with pen device daily. Can substitute based on availability and insurance. 100 each 2   • Lancets (OneTouch Delica Plus [id number]) misc 1 each 3 (Three) Times a Day. Check 3 times a day on insulin tx, poor control. Dx: E 11.9 300 each 3   • Lantus SoloStar 100 UNIT/ML injection pen Inject 22-30 Units under the skin into the appropriate area as directed Daily. 30 mL 1   • metFORMIN ER (GLUCOPHAGE-XR) 500 MG 24 hr tablet Take 2 tablets by mouth 2 (Two) Times a Day With Meals. 360 tablet 1   • omeprazole (priLOSEC) 20 MG capsule Take 1  "capsule by mouth Every Other Day. Pt alternates prilosec with pepcid     • atorvastatin (LIPITOR) 20 MG tablet TAKE 1 TABLET BY MOUTH EVERYDAY AT BEDTIME 90 tablet 1     No facility-administered medications prior to visit.       No opioid medication identified on active medication list. I have reviewed chart for other potential  high risk medication/s and harmful drug interactions in the elderly.          Aspirin is on active medication list. Aspirin use is indicated based on review of current medical condition/s. Pros and cons of this therapy have been discussed today. Benefits of this medication outweigh potential harm.  Patient has been encouraged to continue taking this medication.  .      Patient Active Problem List   Diagnosis   • Type 2 diabetes mellitus with hyperglycemia, with long-term current use of insulin   • Hyperlipidemia   • Environmental allergies   • Reactive airway disease without complication   • GERD (gastroesophageal reflux disease)   • Elevated hematocrit   • Encounter for screening for malignant neoplasm of colon     Advance Care Planning   Advance Care Planning     Advance Directive is not on file.  ACP discussion was held with the patient during this visit. Patient has an advance directive (not in EMR), copy requested.     Objective    Vitals:    05/25/23 0841   BP: 118/70   Pulse: 75   Temp: 96.7 °F (35.9 °C)   SpO2: 99%   Weight: 78.5 kg (173 lb)   Height: 170.2 cm (67\")   PainSc: 0-No pain     Estimated body mass index is 27.1 kg/m² as calculated from the following:    Height as of this encounter: 170.2 cm (67\").    Weight as of this encounter: 78.5 kg (173 lb).    BMI is >= 25 and <30. (Overweight) The following options were offered after discussion;: exercise counseling/recommendations and nutrition counseling/recommendations    Physical Exam  Vitals and nursing note reviewed.   Constitutional:       General: She is not in acute distress.     Appearance: Normal appearance. She is " "well-developed.   HENT:      Head: Normocephalic.      Right Ear: Tympanic membrane and ear canal normal. There is no impacted cerumen.      Left Ear: Tympanic membrane and ear canal normal. There is no impacted cerumen.      Nose: Nose normal.   Eyes:      Conjunctiva/sclera: Conjunctivae normal.      Pupils: Pupils are equal, round, and reactive to light.   Neck:      Vascular: No carotid bruit.   Cardiovascular:      Rate and Rhythm: Normal rate and regular rhythm.      Heart sounds: Normal heart sounds. No murmur heard.  Pulmonary:      Effort: Pulmonary effort is normal. No respiratory distress.      Breath sounds: Normal breath sounds.   Abdominal:      General: Abdomen is flat. Bowel sounds are normal. There is no distension.      Tenderness: There is no abdominal tenderness.   Musculoskeletal:         General: Normal range of motion.   Skin:     General: Skin is warm and dry.      Findings: No rash.   Neurological:      Mental Status: She is alert and oriented to person, place, and time.   Psychiatric:         Behavior: Behavior normal.         Thought Content: Thought content normal.         Judgment: Judgment normal.           Does the patient have evidence of cognitive impairment?   No    Lab Results   Component Value Date    CHLPL 158 2023    TRIG 218 (H) 2023    HDL 52 2023    LDL 70 2023    VLDL 36 2023    HGBA1C 7.50 (H) 2023          HEALTH RISK ASSESSMENT    Smoking Status:  Social History     Tobacco Use   Smoking Status Never   Smokeless Tobacco Never     Alcohol Consumption:  Social History     Substance and Sexual Activity   Alcohol Use Never    Comment: caffeine use-' \"rare\"     Fall Risk Screen:    STEADI Fall Risk Assessment was completed, and patient is at LOW risk for falls.Assessment completed on:2023    Depression Screenin/25/2023     8:42 AM   PHQ-2/PHQ-9 Depression Screening   Little Interest or Pleasure in Doing Things 0-->not at all "   Feeling Down, Depressed or Hopeless 0-->not at all   PHQ-9: Brief Depression Severity Measure Score 0       Health Habits and Functional and Cognitive Screenin/25/2023     8:42 AM   Functional & Cognitive Status   Do you have difficulty preparing food and eating? No   Do you have difficulty bathing yourself, getting dressed or grooming yourself? No   Do you have difficulty using the toilet? No   Do you have difficulty moving around from place to place? No   Do you have trouble with steps or getting out of a bed or a chair? No   Current Diet Well Balanced Diet   Dental Exam Up to date   Eye Exam Up to date   Exercise (times per week) 3 times per week   Current Exercises Include Walking;Pickleball   Do you need help using the phone?  No   Are you deaf or do you have serious difficulty hearing?  No   Do you need help with transportation? No   Do you need help shopping? No   Do you need help preparing meals?  No   Do you need help with housework?  No   Do you need help with laundry? No   Do you need help taking your medications? No   Do you need help managing money? No   Do you ever drive or ride in a car without wearing a seat belt? No   Have you felt unusual stress, anger or loneliness in the last month? No   Who do you live with? Spouse   If you need help, do you have trouble finding someone available to you? No   Have you been bothered in the last four weeks by sexual problems? No   Do you have difficulty concentrating, remembering or making decisions? No       Age-appropriate Screening Schedule:  Refer to the list below for future screening recommendations based on patient's age, sex and/or medical conditions. Orders for these recommended tests are listed in the plan section. The patient has been provided with a written plan.    Health Maintenance   Topic Date Due   • DIABETIC FOOT EXAM  2020   • ANNUAL WELLNESS VISIT  2023   • INFLUENZA VACCINE  2023   • HEMOGLOBIN A1C  10/11/2023   •  LIPID PANEL  04/11/2024   • URINE MICROALBUMIN  04/11/2024   • DIABETIC EYE EXAM  05/04/2024   • MAMMOGRAM  11/21/2024   • DXA SCAN  11/21/2024   • TDAP/TD VACCINES (2 - Td or Tdap) 03/27/2029   • COLORECTAL CANCER SCREENING  04/17/2033   • HEPATITIS C SCREENING  Completed   • COVID-19 Vaccine  Completed   • Pneumococcal Vaccine 65+  Completed   • ZOSTER VACCINE  Completed                  CMS Preventative Services Quick Reference  Risk Factors Identified During Encounter:    None Identified    The above risks/problems have been discussed with the patient.  Pertinent information has been shared with the patient in the After Visit Summary.    Diagnoses and all orders for this visit:    1. Medicare annual wellness visit, subsequent (Primary)    2. Elevated lipoprotein(a)  -     atorvastatin (LIPITOR) 20 MG tablet; Take 1 tablet by mouth every night at bedtime.  Dispense: 90 tablet; Refill: 3    Annual Medicare wellness visit today, she is seeing her endocrinologist regularly reviewed labs done 1 month ago that were overall stable, cholesterol is acceptable.  We discussed the elevated triglycerides which are acceptable.  I do recommend improving her diet overall and continue with exercise.  She got knee injections recently which has helped her activity and being able to play pickle ball again.  Up-to-date with mammogram and DEXA scan in November, colonoscopy in April, immunizations up-to-date.  All other screening is up-to-date.    Follow Up:   Next Medicare Wellness visit to be scheduled in 1 year.      An After Visit Summary and PPPS were made available to the patient.    Caroline Rudd MD

## 2023-08-18 RX ORDER — METFORMIN HYDROCHLORIDE 500 MG/1
TABLET, EXTENDED RELEASE ORAL
Qty: 360 TABLET | Refills: 1 | Status: SHIPPED | OUTPATIENT
Start: 2023-08-18

## 2023-08-25 ENCOUNTER — LAB (OUTPATIENT)
Dept: ENDOCRINOLOGY | Age: 68
End: 2023-08-25
Payer: MEDICARE

## 2023-08-25 DIAGNOSIS — E11.65 TYPE 2 DIABETES MELLITUS WITH HYPERGLYCEMIA, WITH LONG-TERM CURRENT USE OF INSULIN: ICD-10-CM

## 2023-08-25 DIAGNOSIS — E78.5 HYPERLIPIDEMIA, UNSPECIFIED HYPERLIPIDEMIA TYPE: ICD-10-CM

## 2023-08-25 DIAGNOSIS — Z79.4 TYPE 2 DIABETES MELLITUS WITH HYPERGLYCEMIA, WITH LONG-TERM CURRENT USE OF INSULIN: ICD-10-CM

## 2023-08-25 LAB
ALBUMIN SERPL-MCNC: 4.6 G/DL (ref 3.5–5.2)
ALBUMIN/GLOB SERPL: 2.4 G/DL
ALP SERPL-CCNC: 91 U/L (ref 39–117)
ALT SERPL-CCNC: 22 U/L (ref 1–33)
AST SERPL-CCNC: 12 U/L (ref 1–32)
BILIRUB SERPL-MCNC: 0.6 MG/DL (ref 0–1.2)
BUN SERPL-MCNC: 13 MG/DL (ref 8–23)
BUN/CREAT SERPL: 17.6 (ref 7–25)
CALCIUM SERPL-MCNC: 10 MG/DL (ref 8.6–10.5)
CHLORIDE SERPL-SCNC: 103 MMOL/L (ref 98–107)
CHOLEST SERPL-MCNC: 146 MG/DL (ref 0–200)
CO2 SERPL-SCNC: 23.9 MMOL/L (ref 22–29)
CREAT SERPL-MCNC: 0.74 MG/DL (ref 0.57–1)
EGFRCR SERPLBLD CKD-EPI 2021: 87.2 ML/MIN/1.73
GLOBULIN SER CALC-MCNC: 1.9 GM/DL
GLUCOSE SERPL-MCNC: 158 MG/DL (ref 65–99)
HBA1C MFR BLD: 7.5 % (ref 4.8–5.6)
HDLC SERPL-MCNC: 48 MG/DL (ref 40–60)
IMP & REVIEW OF LAB RESULTS: NORMAL
LDLC SERPL CALC-MCNC: 65 MG/DL (ref 0–100)
POTASSIUM SERPL-SCNC: 4.7 MMOL/L (ref 3.5–5.2)
PROT SERPL-MCNC: 6.5 G/DL (ref 6–8.5)
SODIUM SERPL-SCNC: 140 MMOL/L (ref 136–145)
TRIGL SERPL-MCNC: 200 MG/DL (ref 0–150)
VLDLC SERPL CALC-MCNC: 33 MG/DL (ref 5–40)

## 2023-12-21 NOTE — PROGRESS NOTES
"      Lou Kimball is a 64 y.o. patient who presents for follow up of   Chief Complaint   Patient presents with   • Follow-up     64-year-old established patient here for follow-up of yeast dermatitis.  She was seen as a new patient on 6/8/2020 and had significant vaginal dryness as well as tearing and itching in the vaginal area.  She was treated for yeast dermatitis with Diflucan 200 mg for 3 days and Lotrisone.  She is feeling much better and she said this area looks better as well.  She still noticed some dryness but overall is not had much itching.        The following portions of the patient's history were reviewed and updated as appropriate: allergies, current medications and problem list.    Review of Systems   Constitutional: Positive for activity change (quarantine). Negative for appetite change, fatigue, fever and unexpected weight change.   Eyes: Negative for photophobia and visual disturbance.   Respiratory: Negative for cough and shortness of breath.    Cardiovascular: Negative for chest pain and palpitations.   Gastrointestinal: Negative for abdominal distention, abdominal pain, constipation, diarrhea and nausea.   Endocrine: Negative for cold intolerance and heat intolerance.   Genitourinary: Positive for dyspareunia. Negative for dysuria, menstrual problem, pelvic pain, vaginal discharge and vaginal pain.   Musculoskeletal: Negative for back pain.   Skin: Negative for color change and rash.   Allergic/Immunologic: Positive for environmental allergies.   Neurological: Negative for headaches.   Hematological: Negative for adenopathy. Does not bruise/bleed easily.   Psychiatric/Behavioral: Negative for dysphoric mood. The patient is not nervous/anxious.    All other systems reviewed and are negative.      /82   Ht 170.2 cm (67\")   LMP  (LMP Unknown)   BMI 27.41 kg/m²     Physical Exam   Constitutional: She is oriented to person, place, and time. She appears well-developed and well-nourished. "   HENT:   Head: Normocephalic and atraumatic.   Eyes: Conjunctivae are normal. No scleral icterus.   Cardiovascular: Normal rate and regular rhythm.   Pulmonary/Chest: Effort normal and breath sounds normal. Right breast exhibits no inverted nipple, no mass, no nipple discharge, no skin change and no tenderness. Left breast exhibits no inverted nipple, no mass, no nipple discharge, no skin change and no tenderness.   Abdominal: Soft. She exhibits no distension and no mass. There is no tenderness. There is no rebound and no guarding. No hernia.   Genitourinary:       Pelvic exam was performed with patient supine. There is no rash, tenderness or lesion on the right labia. There is no rash, tenderness or lesion on the left labia. Uterus is not deviated, not enlarged, not fixed and not tender. Cervix exhibits no motion tenderness, no discharge and no friability. Right adnexum displays no mass, no tenderness and no fullness. Left adnexum displays no mass, no tenderness and no fullness. No erythema, tenderness or bleeding in the vagina. No foreign body in the vagina. No signs of injury around the vagina. No vaginal discharge found.   Neurological: She is alert and oriented to person, place, and time.   Skin: Skin is warm and dry.   Psychiatric: She has a normal mood and affect. Her behavior is normal. Judgment and thought content normal.   Nursing note and vitals reviewed.      A/P:  1.  Hx of vaginal yeast dermatitis- area looks much improved and patient has no further symptoms.  2.  Vaginal atrophy- Rx estrace cream x 2/week. Patient counseled that vaginal estrogen rings, creams and tablets are available and highly effective at treating local vaginal symptoms such as atrophy and vaginal dryness.  Vaginal estrogen does not cause uterine overgrowth and does not require a progestogen to protect the uterus.  Very small amounts of estrogen are absorbed systemically.  For patients with a history of an estrogen dependent  cancer such as breast cancer, the decision to use local estrogen for local vaginal symptoms should be made after consultation with her oncologist.  Possible side effects include local irritation or burning and/or vaginal bleeding and should always be reported.    3. [facility]- UTD annual 6/2020.     Assessment/Plan   Lou was seen today for follow-up.    Diagnoses and all orders for this visit:    Screening for unspecified condition  -     POC Urinalysis Dipstick    Yeast dermatitis    Vaginal atrophy                 No follow-ups on file.      Keila Green MD    7/1/2020  14:41   No

## 2024-05-01 ENCOUNTER — LAB (OUTPATIENT)
Dept: LAB | Facility: HOSPITAL | Age: 69
End: 2024-05-01
Payer: MEDICARE

## 2024-05-01 ENCOUNTER — HOSPITAL ENCOUNTER (OUTPATIENT)
Dept: CARDIOLOGY | Facility: HOSPITAL | Age: 69
Discharge: HOME | End: 2024-05-01
Payer: MEDICARE

## 2024-05-01 ENCOUNTER — TRANSCRIBE ORDERS (OUTPATIENT)
Dept: ADMINISTRATIVE | Facility: HOSPITAL | Age: 69
End: 2024-05-01
Payer: MEDICARE

## 2024-05-01 ENCOUNTER — HOSPITAL ENCOUNTER (OUTPATIENT)
Dept: GENERAL RADIOLOGY | Facility: HOSPITAL | Age: 69
Discharge: HOME | End: 2024-05-01
Payer: MEDICARE

## 2024-05-01 DIAGNOSIS — M17.0 PRIMARY OSTEOARTHRITIS OF BOTH KNEES: ICD-10-CM

## 2024-05-01 DIAGNOSIS — M17.0 PRIMARY OSTEOARTHRITIS OF BOTH KNEES: Primary | ICD-10-CM

## 2024-05-01 DIAGNOSIS — R79.1 ABNORMAL COAGULATION PROFILE: ICD-10-CM

## 2024-05-01 DIAGNOSIS — Z01.811 PRE-OP CHEST EXAM: ICD-10-CM

## 2024-05-01 DIAGNOSIS — R73.09 OTHER ABNORMAL GLUCOSE: ICD-10-CM

## 2024-05-01 LAB
INR PPP: 0.96 (ref 0.9–1.1)
PROTHROMBIN TIME: 13 SECONDS (ref 11.7–14.2)
QT INTERVAL: 373 MS
QTC INTERVAL: 439 MS

## 2024-05-01 PROCEDURE — 71046 X-RAY EXAM CHEST 2 VIEWS: CPT

## 2024-05-01 PROCEDURE — 93005 ELECTROCARDIOGRAM TRACING: CPT | Performed by: ORTHOPAEDIC SURGERY

## 2024-05-01 PROCEDURE — 85610 PROTHROMBIN TIME: CPT

## 2024-05-01 PROCEDURE — 36415 COLL VENOUS BLD VENIPUNCTURE: CPT

## 2024-10-24 ENCOUNTER — TRANSCRIBE ORDERS (OUTPATIENT)
Dept: ADMINISTRATIVE | Facility: HOSPITAL | Age: 69
End: 2024-10-24
Payer: MEDICARE

## 2024-10-24 ENCOUNTER — LAB (OUTPATIENT)
Dept: LAB | Facility: HOSPITAL | Age: 69
End: 2024-10-24
Payer: MEDICARE

## 2024-10-24 DIAGNOSIS — M17.11 ARTHRITIS OF RIGHT KNEE: ICD-10-CM

## 2024-10-24 DIAGNOSIS — M17.11 ARTHRITIS OF RIGHT KNEE: Primary | ICD-10-CM

## 2024-10-24 LAB
ALBUMIN SERPL-MCNC: 4.1 G/DL (ref 3.5–5.2)
ALBUMIN/GLOB SERPL: 1.4 G/DL
ALP SERPL-CCNC: 107 U/L (ref 39–117)
ALT SERPL W P-5'-P-CCNC: 24 U/L (ref 1–33)
ANION GAP SERPL CALCULATED.3IONS-SCNC: 12.9 MMOL/L (ref 5–15)
AST SERPL-CCNC: 17 U/L (ref 1–32)
BACTERIA UR QL AUTO: ABNORMAL /HPF
BASOPHILS # BLD AUTO: 0.05 10*3/MM3 (ref 0–0.2)
BASOPHILS NFR BLD AUTO: 0.4 % (ref 0–1.5)
BILIRUB SERPL-MCNC: 0.5 MG/DL (ref 0–1.2)
BILIRUB UR QL STRIP: NEGATIVE
BUN SERPL-MCNC: 9 MG/DL (ref 8–23)
BUN/CREAT SERPL: 9.2 (ref 7–25)
CALCIUM SPEC-SCNC: 9.3 MG/DL (ref 8.6–10.5)
CHLORIDE SERPL-SCNC: 102 MMOL/L (ref 98–107)
CLARITY UR: CLEAR
CO2 SERPL-SCNC: 23.1 MMOL/L (ref 22–29)
COLOR UR: YELLOW
CREAT SERPL-MCNC: 0.98 MG/DL (ref 0.57–1)
DEPRECATED RDW RBC AUTO: 43.5 FL (ref 37–54)
EGFRCR SERPLBLD CKD-EPI 2021: 61.8 ML/MIN/1.73
EOSINOPHIL # BLD AUTO: 0.39 10*3/MM3 (ref 0–0.4)
EOSINOPHIL NFR BLD AUTO: 3.5 % (ref 0.3–6.2)
ERYTHROCYTE [DISTWIDTH] IN BLOOD BY AUTOMATED COUNT: 13.4 % (ref 12.3–15.4)
GLOBULIN UR ELPH-MCNC: 3 GM/DL
GLUCOSE SERPL-MCNC: 229 MG/DL (ref 65–99)
GLUCOSE UR STRIP-MCNC: ABNORMAL MG/DL
HBA1C MFR BLD: 7.1 % (ref 4.8–5.6)
HCT VFR BLD AUTO: 42.8 % (ref 34–46.6)
HGB BLD-MCNC: 14.4 G/DL (ref 12–15.9)
HGB UR QL STRIP.AUTO: NEGATIVE
HYALINE CASTS UR QL AUTO: ABNORMAL /LPF
IMM GRANULOCYTES # BLD AUTO: 0.02 10*3/MM3 (ref 0–0.05)
IMM GRANULOCYTES NFR BLD AUTO: 0.2 % (ref 0–0.5)
INR PPP: 0.93 (ref 0.9–1.1)
KETONES UR QL STRIP: ABNORMAL
LEUKOCYTE ESTERASE UR QL STRIP.AUTO: ABNORMAL
LYMPHOCYTES # BLD AUTO: 2.16 10*3/MM3 (ref 0.7–3.1)
LYMPHOCYTES NFR BLD AUTO: 19.1 % (ref 19.6–45.3)
MCH RBC QN AUTO: 29.6 PG (ref 26.6–33)
MCHC RBC AUTO-ENTMCNC: 33.6 G/DL (ref 31.5–35.7)
MCV RBC AUTO: 88.1 FL (ref 79–97)
MONOCYTES # BLD AUTO: 0.79 10*3/MM3 (ref 0.1–0.9)
MONOCYTES NFR BLD AUTO: 7 % (ref 5–12)
NEUTROPHILS NFR BLD AUTO: 69.8 % (ref 42.7–76)
NEUTROPHILS NFR BLD AUTO: 7.89 10*3/MM3 (ref 1.7–7)
NITRITE UR QL STRIP: NEGATIVE
NRBC BLD AUTO-RTO: 0 /100 WBC (ref 0–0.2)
PH UR STRIP.AUTO: <=5 [PH] (ref 5–8)
PLATELET # BLD AUTO: 333 10*3/MM3 (ref 140–450)
PMV BLD AUTO: 10.9 FL (ref 6–12)
POTASSIUM SERPL-SCNC: 4.3 MMOL/L (ref 3.5–5.2)
PROT SERPL-MCNC: 7.1 G/DL (ref 6–8.5)
PROT UR QL STRIP: ABNORMAL
PROTHROMBIN TIME: 12.7 SECONDS (ref 11.7–14.2)
RBC # BLD AUTO: 4.86 10*6/MM3 (ref 3.77–5.28)
RBC # UR STRIP: ABNORMAL /HPF
REF LAB TEST METHOD: ABNORMAL
SODIUM SERPL-SCNC: 138 MMOL/L (ref 136–145)
SP GR UR STRIP: 1.02 (ref 1–1.03)
SQUAMOUS #/AREA URNS HPF: ABNORMAL /HPF
UROBILINOGEN UR QL STRIP: ABNORMAL
WBC # UR STRIP: ABNORMAL /HPF
WBC NRBC COR # BLD AUTO: 11.3 10*3/MM3 (ref 3.4–10.8)

## 2024-10-24 PROCEDURE — 81001 URINALYSIS AUTO W/SCOPE: CPT

## 2024-10-24 PROCEDURE — 80053 COMPREHEN METABOLIC PANEL: CPT

## 2024-10-24 PROCEDURE — 36415 COLL VENOUS BLD VENIPUNCTURE: CPT

## 2024-10-24 PROCEDURE — 85610 PROTHROMBIN TIME: CPT

## 2024-10-24 PROCEDURE — 83036 HEMOGLOBIN GLYCOSYLATED A1C: CPT

## 2024-10-24 PROCEDURE — 85025 COMPLETE CBC W/AUTO DIFF WBC: CPT

## (undated) DEVICE — Device

## (undated) DEVICE — CANN NASL CO2 TRULINK W/O2 A/

## (undated) DEVICE — SYRINGE, LUER SLIP, STERILE, 60ML: Brand: MEDLINE

## (undated) DEVICE — FLEX ADVANTAGE 1500CC: Brand: FLEX ADVANTAGE

## (undated) DEVICE — KT ORCA ORCAPOD DISP STRL

## (undated) DEVICE — GOWN ISOL W/THUMB UNIV BLU BX/15

## (undated) DEVICE — GOWN ,SIRUS,NONREINFORCED 3XL: Brand: MEDLINE